# Patient Record
Sex: FEMALE | Race: WHITE | NOT HISPANIC OR LATINO | Employment: FULL TIME | URBAN - METROPOLITAN AREA
[De-identification: names, ages, dates, MRNs, and addresses within clinical notes are randomized per-mention and may not be internally consistent; named-entity substitution may affect disease eponyms.]

---

## 2017-03-07 ENCOUNTER — HOSPITAL ENCOUNTER (OUTPATIENT)
Dept: LAB | Facility: MEDICAL CENTER | Age: 47
End: 2017-03-07
Attending: FAMILY MEDICINE
Payer: COMMERCIAL

## 2017-03-07 LAB
EST. AVERAGE GLUCOSE BLD GHB EST-MCNC: 108 MG/DL
GLUCOSE SERPL-MCNC: 96 MG/DL (ref 65–99)
HBA1C MFR BLD: 5.4 % (ref 0–5.6)

## 2017-03-07 PROCEDURE — 83036 HEMOGLOBIN GLYCOSYLATED A1C: CPT

## 2017-03-07 PROCEDURE — 36415 COLL VENOUS BLD VENIPUNCTURE: CPT

## 2017-03-07 PROCEDURE — 82947 ASSAY GLUCOSE BLOOD QUANT: CPT

## 2017-08-22 ENCOUNTER — HOSPITAL ENCOUNTER (OUTPATIENT)
Dept: RADIOLOGY | Facility: MEDICAL CENTER | Age: 47
End: 2017-08-22
Attending: FAMILY MEDICINE
Payer: COMMERCIAL

## 2017-08-22 DIAGNOSIS — M54.2 NECK PAIN: ICD-10-CM

## 2017-08-22 DIAGNOSIS — M54.9 BACK PAIN, UNSPECIFIED BACK LOCATION, UNSPECIFIED BACK PAIN LATERALITY, UNSPECIFIED CHRONICITY: ICD-10-CM

## 2017-08-22 PROCEDURE — 72040 X-RAY EXAM NECK SPINE 2-3 VW: CPT

## 2017-08-22 PROCEDURE — 72100 X-RAY EXAM L-S SPINE 2/3 VWS: CPT

## 2017-10-17 ENCOUNTER — HOSPITAL ENCOUNTER (OUTPATIENT)
Facility: MEDICAL CENTER | Age: 47
End: 2017-10-17
Payer: COMMERCIAL

## 2017-10-17 LAB
ALBUMIN SERPL BCP-MCNC: 3.4 G/DL (ref 3.2–4.9)
ALBUMIN/GLOB SERPL: 0.9 G/DL
ALP SERPL-CCNC: 58 U/L (ref 30–99)
ALT SERPL-CCNC: 27 U/L (ref 2–50)
ANION GAP SERPL CALC-SCNC: 11 MMOL/L (ref 0–11.9)
AST SERPL-CCNC: 20 U/L (ref 12–45)
BDY FAT % MEASURED: 25.9 %
BILIRUB SERPL-MCNC: 0.6 MG/DL (ref 0.1–1.5)
BP DIAS: 62 MMHG
BP SYS: 98 MMHG
BUN SERPL-MCNC: 11 MG/DL (ref 8–22)
CALCIUM SERPL-MCNC: 9.5 MG/DL (ref 8.5–10.5)
CHLORIDE SERPL-SCNC: 103 MMOL/L (ref 96–112)
CHOLEST SERPL-MCNC: 174 MG/DL (ref 100–199)
CO2 SERPL-SCNC: 25 MMOL/L (ref 20–33)
CREAT SERPL-MCNC: 0.72 MG/DL (ref 0.5–1.4)
DIABETES HTDIA: NO
EVENT NAME HTEVT: NORMAL
GFR SERPL CREATININE-BSD FRML MDRD: >60 ML/MIN/1.73 M 2
GLOBULIN SER CALC-MCNC: 3.6 G/DL (ref 1.9–3.5)
GLUCOSE SERPL-MCNC: 93 MG/DL (ref 65–99)
HDLC SERPL-MCNC: 64 MG/DL
HYPERTENSION HTHYP: NO
LDLC SERPL CALC-MCNC: 88 MG/DL
POTASSIUM SERPL-SCNC: 4.1 MMOL/L (ref 3.6–5.5)
PROT SERPL-MCNC: 7 G/DL (ref 6–8.2)
SCREENING LOC CITY HTCIT: NORMAL
SCREENING LOC STATE HTSTA: NORMAL
SCREENING LOCATION HTLOC: NORMAL
SODIUM SERPL-SCNC: 139 MMOL/L (ref 135–145)
SUBSCRIBER ID HTSID: NORMAL
TRIGL SERPL-MCNC: 110 MG/DL (ref 0–149)

## 2018-03-22 ENCOUNTER — APPOINTMENT (OUTPATIENT)
Dept: RADIOLOGY | Facility: MEDICAL CENTER | Age: 48
End: 2018-03-22
Attending: EMERGENCY MEDICINE
Payer: COMMERCIAL

## 2018-03-22 ENCOUNTER — RESOLUTE PROFESSIONAL BILLING HOSPITAL PROF FEE (OUTPATIENT)
Dept: HOSPITALIST | Facility: MEDICAL CENTER | Age: 48
End: 2018-03-22
Payer: COMMERCIAL

## 2018-03-22 ENCOUNTER — OFFICE VISIT (OUTPATIENT)
Dept: URGENT CARE | Facility: PHYSICIAN GROUP | Age: 48
End: 2018-03-22
Payer: COMMERCIAL

## 2018-03-22 ENCOUNTER — HOSPITAL ENCOUNTER (OUTPATIENT)
Facility: MEDICAL CENTER | Age: 48
End: 2018-03-23
Attending: EMERGENCY MEDICINE | Admitting: HOSPITALIST
Payer: COMMERCIAL

## 2018-03-22 VITALS
OXYGEN SATURATION: 99 % | WEIGHT: 147 LBS | HEART RATE: 168 BPM | DIASTOLIC BLOOD PRESSURE: 68 MMHG | SYSTOLIC BLOOD PRESSURE: 110 MMHG | BODY MASS INDEX: 24.49 KG/M2 | TEMPERATURE: 98.8 F | HEIGHT: 65 IN | RESPIRATION RATE: 16 BRPM

## 2018-03-22 DIAGNOSIS — F41.9 ANXIETY: ICD-10-CM

## 2018-03-22 DIAGNOSIS — R00.0 TACHYCARDIA: ICD-10-CM

## 2018-03-22 DIAGNOSIS — I47.10 SVT (SUPRAVENTRICULAR TACHYCARDIA): ICD-10-CM

## 2018-03-22 DIAGNOSIS — R00.2 HEART PALPITATIONS: Primary | ICD-10-CM

## 2018-03-22 PROBLEM — E83.39 HYPOPHOSPHATASIA: Status: ACTIVE | Noted: 2018-03-22

## 2018-03-22 LAB
ALBUMIN SERPL BCP-MCNC: 4.2 G/DL (ref 3.2–4.9)
ALBUMIN/GLOB SERPL: 1.8 G/DL
ALP SERPL-CCNC: 49 U/L (ref 30–99)
ALT SERPL-CCNC: 10 U/L (ref 2–50)
ANION GAP SERPL CALC-SCNC: 10 MMOL/L (ref 0–11.9)
APTT PPP: 27.7 SEC (ref 24.7–36)
AST SERPL-CCNC: 16 U/L (ref 12–45)
BASOPHILS # BLD AUTO: 0.5 % (ref 0–1.8)
BASOPHILS # BLD: 0.03 K/UL (ref 0–0.12)
BILIRUB SERPL-MCNC: 0.6 MG/DL (ref 0.1–1.5)
BNP SERPL-MCNC: 24 PG/ML (ref 0–100)
BUN SERPL-MCNC: 10 MG/DL (ref 8–22)
CALCIUM SERPL-MCNC: 8.7 MG/DL (ref 8.5–10.5)
CHLORIDE SERPL-SCNC: 107 MMOL/L (ref 96–112)
CO2 SERPL-SCNC: 18 MMOL/L (ref 20–33)
CREAT SERPL-MCNC: 0.8 MG/DL (ref 0.5–1.4)
EKG IMPRESSION: NORMAL
EOSINOPHIL # BLD AUTO: 0.13 K/UL (ref 0–0.51)
EOSINOPHIL NFR BLD: 2 % (ref 0–6.9)
ERYTHROCYTE [DISTWIDTH] IN BLOOD BY AUTOMATED COUNT: 39.8 FL (ref 35.9–50)
EST. AVERAGE GLUCOSE BLD GHB EST-MCNC: 108 MG/DL
GLOBULIN SER CALC-MCNC: 2.3 G/DL (ref 1.9–3.5)
GLUCOSE SERPL-MCNC: 118 MG/DL (ref 65–99)
HBA1C MFR BLD: 5.4 % (ref 0–5.6)
HCT VFR BLD AUTO: 42.7 % (ref 37–47)
HGB BLD-MCNC: 14.8 G/DL (ref 12–16)
IMM GRANULOCYTES # BLD AUTO: 0.02 K/UL (ref 0–0.11)
IMM GRANULOCYTES NFR BLD AUTO: 0.3 % (ref 0–0.9)
INR PPP: 1.05 (ref 0.87–1.13)
LIPASE SERPL-CCNC: 12 U/L (ref 11–82)
LYMPHOCYTES # BLD AUTO: 1.99 K/UL (ref 1–4.8)
LYMPHOCYTES NFR BLD: 30.3 % (ref 22–41)
MAGNESIUM SERPL-MCNC: 1.8 MG/DL (ref 1.5–2.5)
MCH RBC QN AUTO: 31.8 PG (ref 27–33)
MCHC RBC AUTO-ENTMCNC: 34.7 G/DL (ref 33.6–35)
MCV RBC AUTO: 91.6 FL (ref 81.4–97.8)
MONOCYTES # BLD AUTO: 0.63 K/UL (ref 0–0.85)
MONOCYTES NFR BLD AUTO: 9.6 % (ref 0–13.4)
NEUTROPHILS # BLD AUTO: 3.76 K/UL (ref 2–7.15)
NEUTROPHILS NFR BLD: 57.3 % (ref 44–72)
NRBC # BLD AUTO: 0 K/UL
NRBC BLD-RTO: 0 /100 WBC
PHOSPHATE SERPL-MCNC: 1.8 MG/DL (ref 2.5–4.5)
PLATELET # BLD AUTO: 209 K/UL (ref 164–446)
PMV BLD AUTO: 10.6 FL (ref 9–12.9)
POTASSIUM SERPL-SCNC: 3.8 MMOL/L (ref 3.6–5.5)
PROT SERPL-MCNC: 6.5 G/DL (ref 6–8.2)
PROTHROMBIN TIME: 13.4 SEC (ref 12–14.6)
RBC # BLD AUTO: 4.66 M/UL (ref 4.2–5.4)
SODIUM SERPL-SCNC: 135 MMOL/L (ref 135–145)
T4 FREE SERPL-MCNC: 0.92 NG/DL (ref 0.53–1.43)
TROPONIN I SERPL-MCNC: 0.02 NG/ML (ref 0–0.04)
TSH SERPL DL<=0.005 MIU/L-ACNC: 2.51 UIU/ML (ref 0.38–5.33)
WBC # BLD AUTO: 6.6 K/UL (ref 4.8–10.8)

## 2018-03-22 PROCEDURE — 700101 HCHG RX REV CODE 250: Performed by: HOSPITALIST

## 2018-03-22 PROCEDURE — 700102 HCHG RX REV CODE 250 W/ 637 OVERRIDE(OP): Performed by: INTERNAL MEDICINE

## 2018-03-22 PROCEDURE — 96365 THER/PROPH/DIAG IV INF INIT: CPT

## 2018-03-22 PROCEDURE — 84100 ASSAY OF PHOSPHORUS: CPT

## 2018-03-22 PROCEDURE — 99220 PR INITIAL OBSERVATION CARE,LEVL III: CPT | Performed by: HOSPITALIST

## 2018-03-22 PROCEDURE — 93005 ELECTROCARDIOGRAM TRACING: CPT | Performed by: EMERGENCY MEDICINE

## 2018-03-22 PROCEDURE — 84443 ASSAY THYROID STIM HORMONE: CPT

## 2018-03-22 PROCEDURE — 83735 ASSAY OF MAGNESIUM: CPT

## 2018-03-22 PROCEDURE — G0378 HOSPITAL OBSERVATION PER HR: HCPCS

## 2018-03-22 PROCEDURE — 84484 ASSAY OF TROPONIN QUANT: CPT

## 2018-03-22 PROCEDURE — 36415 COLL VENOUS BLD VENIPUNCTURE: CPT

## 2018-03-22 PROCEDURE — 83036 HEMOGLOBIN GLYCOSYLATED A1C: CPT

## 2018-03-22 PROCEDURE — 700105 HCHG RX REV CODE 258: Performed by: HOSPITALIST

## 2018-03-22 PROCEDURE — 96366 THER/PROPH/DIAG IV INF ADDON: CPT

## 2018-03-22 PROCEDURE — 94760 N-INVAS EAR/PLS OXIMETRY 1: CPT

## 2018-03-22 PROCEDURE — A9270 NON-COVERED ITEM OR SERVICE: HCPCS | Performed by: INTERNAL MEDICINE

## 2018-03-22 PROCEDURE — 85025 COMPLETE CBC W/AUTO DIFF WBC: CPT

## 2018-03-22 PROCEDURE — 93005 ELECTROCARDIOGRAM TRACING: CPT

## 2018-03-22 PROCEDURE — 71045 X-RAY EXAM CHEST 1 VIEW: CPT

## 2018-03-22 PROCEDURE — 93306 TTE W/DOPPLER COMPLETE: CPT

## 2018-03-22 PROCEDURE — 85610 PROTHROMBIN TIME: CPT

## 2018-03-22 PROCEDURE — 80053 COMPREHEN METABOLIC PANEL: CPT

## 2018-03-22 PROCEDURE — 93306 TTE W/DOPPLER COMPLETE: CPT | Mod: 26 | Performed by: INTERNAL MEDICINE

## 2018-03-22 PROCEDURE — 99285 EMERGENCY DEPT VISIT HI MDM: CPT

## 2018-03-22 PROCEDURE — 83690 ASSAY OF LIPASE: CPT

## 2018-03-22 PROCEDURE — 83880 ASSAY OF NATRIURETIC PEPTIDE: CPT

## 2018-03-22 PROCEDURE — 85730 THROMBOPLASTIN TIME PARTIAL: CPT

## 2018-03-22 PROCEDURE — 93000 ELECTROCARDIOGRAM COMPLETE: CPT | Performed by: PHYSICIAN ASSISTANT

## 2018-03-22 PROCEDURE — 84439 ASSAY OF FREE THYROXINE: CPT

## 2018-03-22 PROCEDURE — 99205 OFFICE O/P NEW HI 60 MIN: CPT | Performed by: PHYSICIAN ASSISTANT

## 2018-03-22 RX ORDER — BISACODYL 10 MG
10 SUPPOSITORY, RECTAL RECTAL
Status: DISCONTINUED | OUTPATIENT
Start: 2018-03-22 | End: 2018-03-23 | Stop reason: HOSPADM

## 2018-03-22 RX ORDER — ONDANSETRON 2 MG/ML
4 INJECTION INTRAMUSCULAR; INTRAVENOUS EVERY 4 HOURS PRN
Status: DISCONTINUED | OUTPATIENT
Start: 2018-03-22 | End: 2018-03-23 | Stop reason: HOSPADM

## 2018-03-22 RX ORDER — PROMETHAZINE HYDROCHLORIDE 25 MG/1
12.5-25 SUPPOSITORY RECTAL EVERY 4 HOURS PRN
Status: DISCONTINUED | OUTPATIENT
Start: 2018-03-22 | End: 2018-03-23 | Stop reason: HOSPADM

## 2018-03-22 RX ORDER — IBUPROFEN 200 MG
400-800 TABLET ORAL EVERY 6 HOURS PRN
Status: SHIPPED | COMMUNITY
End: 2022-05-20

## 2018-03-22 RX ORDER — SODIUM CHLORIDE 9 MG/ML
INJECTION, SOLUTION INTRAVENOUS CONTINUOUS
Status: DISCONTINUED | OUTPATIENT
Start: 2018-03-22 | End: 2018-03-23 | Stop reason: HOSPADM

## 2018-03-22 RX ORDER — PROMETHAZINE HYDROCHLORIDE 25 MG/1
12.5-25 TABLET ORAL EVERY 4 HOURS PRN
Status: DISCONTINUED | OUTPATIENT
Start: 2018-03-22 | End: 2018-03-23 | Stop reason: HOSPADM

## 2018-03-22 RX ORDER — ONDANSETRON 4 MG/1
4 TABLET, ORALLY DISINTEGRATING ORAL EVERY 4 HOURS PRN
Status: DISCONTINUED | OUTPATIENT
Start: 2018-03-22 | End: 2018-03-23 | Stop reason: HOSPADM

## 2018-03-22 RX ORDER — ACETAMINOPHEN 325 MG/1
650 TABLET ORAL EVERY 6 HOURS PRN
Status: DISCONTINUED | OUTPATIENT
Start: 2018-03-22 | End: 2018-03-23 | Stop reason: HOSPADM

## 2018-03-22 RX ORDER — AMOXICILLIN 250 MG
2 CAPSULE ORAL 2 TIMES DAILY
Status: DISCONTINUED | OUTPATIENT
Start: 2018-03-22 | End: 2018-03-23 | Stop reason: HOSPADM

## 2018-03-22 RX ORDER — POLYETHYLENE GLYCOL 3350 17 G/17G
1 POWDER, FOR SOLUTION ORAL
Status: DISCONTINUED | OUTPATIENT
Start: 2018-03-22 | End: 2018-03-23 | Stop reason: HOSPADM

## 2018-03-22 RX ORDER — ALPRAZOLAM 0.5 MG/1
0.5 TABLET ORAL
Status: COMPLETED | OUTPATIENT
Start: 2018-03-22 | End: 2018-03-22

## 2018-03-22 RX ADMIN — POTASSIUM PHOSPHATE, MONOBASIC AND POTASSIUM PHOSPHATE, DIBASIC 30 MMOL: 224; 236 INJECTION, SOLUTION INTRAVENOUS at 18:02

## 2018-03-22 RX ADMIN — SODIUM CHLORIDE: 9 INJECTION, SOLUTION INTRAVENOUS at 16:10

## 2018-03-22 RX ADMIN — ALPRAZOLAM 0.25 MG: 0.5 TABLET ORAL at 23:10

## 2018-03-22 ASSESSMENT — ENCOUNTER SYMPTOMS
TINGLING: 0
COUGH: 0
MYALGIAS: 0
SPEECH CHANGE: 0
LOSS OF CONSCIOUSNESS: 0
FEVER: 0
DIZZINESS: 0
FEVER: 0
PND: 0
WHEEZING: 0
DIARRHEA: 0
SENSORY CHANGE: 0
SHORTNESS OF BREATH: 0
DOUBLE VISION: 0
CONSTIPATION: 0
HEARTBURN: 0
SENSORY CHANGE: 0
FOCAL WEAKNESS: 0
PALPITATIONS: 1
PALPITATIONS: 1
CHEST PRESSURE: 0
DEPRESSION: 0
SPUTUM PRODUCTION: 0
ABDOMINAL PAIN: 0
HEADACHES: 0
BLURRED VISION: 0
CHILLS: 0
SHORTNESS OF BREATH: 0
VOMITING: 0
PND: 0
NAUSEA: 0
ORTHOPNEA: 0
VOMITING: 0
COUGH: 0
HEADACHES: 0
WEIGHT LOSS: 0
BRUISES/BLEEDS EASILY: 0
NECK PAIN: 0
FOCAL WEAKNESS: 0
NEAR-SYNCOPE: 0
DIZZINESS: 0
DIAPHORESIS: 1
HEMOPTYSIS: 0
SPUTUM PRODUCTION: 0
NAUSEA: 0
SYNCOPE: 0
CLAUDICATION: 0
WEAKNESS: 0
DIAPHORESIS: 1

## 2018-03-22 ASSESSMENT — COPD QUESTIONNAIRES
HAVE YOU SMOKED AT LEAST 100 CIGARETTES IN YOUR ENTIRE LIFE: NO/DON'T KNOW
DURING THE PAST 4 WEEKS HOW MUCH DID YOU FEEL SHORT OF BREATH: NONE/LITTLE OF THE TIME
DO YOU EVER COUGH UP ANY MUCUS OR PHLEGM?: NO/ONLY WITH OCCASIONAL COLDS OR INFECTIONS
COPD SCREENING SCORE: 1

## 2018-03-22 ASSESSMENT — PATIENT HEALTH QUESTIONNAIRE - PHQ9
2. FEELING DOWN, DEPRESSED, IRRITABLE, OR HOPELESS: NOT AT ALL
SUM OF ALL RESPONSES TO PHQ9 QUESTIONS 1 AND 2: 0
1. LITTLE INTEREST OR PLEASURE IN DOING THINGS: NOT AT ALL

## 2018-03-22 ASSESSMENT — LIFESTYLE VARIABLES
EVER_SMOKED: NEVER
DO YOU DRINK ALCOHOL: NO
EVER_SMOKED: NEVER

## 2018-03-22 ASSESSMENT — PAIN SCALES - GENERAL
PAINLEVEL_OUTOF10: 0

## 2018-03-22 NOTE — ED NOTES
Med Rec completed per patient  Allergies reviewed  No ORAL antibiotics in last 30 days    Patient took an immune boost this AM 3-22-18

## 2018-03-22 NOTE — CONSULTS
DATE OF CONSULTATION: 3/22/18    REASON FOR CONSULTATION: SVT    HISTORY OF PRESENT ILLNESS:   48 yr old with no significant PMx presented to the ED after she was found to have SVT at urgent care today. Patient reports waking up suddenly with sensation of palpitations on continuous duration, not associated with any exertion, chest pain, shortness of breath, dizziness, loss of consciousness, or sense of doom. Patient denies prior history of similar symptoms and denies recent alcohol, drug, or tobacco use.   She is a active individual who is a runner and denies any palpitations with exertion. Grandfather had CAD other denies any arrhythmia or family history of SCD.     PAST MEDICAL HISTORY:  History reviewed. No pertinent past medical history.      SURGICAL HISTORY:  Past Surgical History:   Procedure Laterality Date   • APPENDECTOMY     • GYN SURGERY         • OTHER ORTHOPEDIC SURGERY      right hip         SOCIAL HISTORY:  Social History     Social History   • Marital status:      Spouse name: N/A   • Number of children: N/A   • Years of education: N/A     Social History Main Topics   • Smoking status: Never Smoker   • Smokeless tobacco: Never Used   • Alcohol use Yes      Comment: occ   • Drug use: No   • Sexual activity: Not on file     Other Topics Concern   • Not on file     Social History Narrative   • No narrative on file         FAMILY HISTORY:  History reviewed. No pertinent family history.  Grandfather: CAD,  of MI @age of 65.    HOME MEDICATIONS:   Prior to Admission medications    Medication Sig Start Date End Date Taking? Authorizing Provider   Calcium Carb-Cholecalciferol (CALCIUM 500 + D3 PO) Take 1 Tab by mouth every day.   Yes Physician Outpatient   CINNAMON PO Take 1 Tab by mouth every day.   Yes Physician Outpatient   ibuprofen (MOTRIN) 200 MG Tab Take 400-800 mg by mouth every 6 hours as needed for Mild Pain.   Yes Physician Outpatient         REVIEW OF SYSTEMS:  Review of  "Systems   Constitutional: Positive for diaphoresis. Negative for chills, fever and weight loss.   HENT: Negative for hearing loss.    Eyes: Negative for blurred vision.   Respiratory: Negative for cough, sputum production, shortness of breath and wheezing.    Cardiovascular: Positive for palpitations. Negative for chest pain, orthopnea, claudication, leg swelling and PND.   Gastrointestinal: Negative for abdominal pain, constipation, diarrhea, heartburn, nausea and vomiting.   Genitourinary: Negative for dysuria.   Musculoskeletal: Negative for myalgias.   Skin: Negative for rash.   Neurological: Negative for dizziness, sensory change, focal weakness, weakness and headaches.   Psychiatric/Behavioral: Negative for depression.       PHYSICAL EXAMINATION:  Vitals:    03/22/18 0939 03/22/18 1000 03/22/18 1030 03/22/18 1100   BP: 134/83      Pulse: (!) 118 (!) 109 (!) 116 (!) 104   Resp: 20 20 15 15   Temp: 37.6 °C (99.7 °F)      SpO2: 99% 98% 98% 99%   Weight: 63.5 kg (140 lb)      Height: 1.651 m (5' 5\")        Body mass index is 23.3 kg/m².  /83   Pulse (!) 104   Temp 37.6 °C (99.7 °F)   Resp 15   Ht 1.651 m (5' 5\")   Wt 63.5 kg (140 lb)   LMP 02/27/2018   SpO2 99%   BMI 23.30 kg/m²   O2 therapy: Pulse Oximetry: 99 %, O2 Delivery: None (Room Air)    Physical Exam   Constitutional: She is oriented to person, place, and time and well-developed, well-nourished, and in no distress. No distress.   HENT:   Head: Normocephalic and atraumatic.   Eyes: EOM are normal. Pupils are equal, round, and reactive to light.   Neck: Normal range of motion. No JVD present. No thyromegaly present.   Cardiovascular: Regular rhythm and normal heart sounds.  Exam reveals no gallop and no friction rub.    No murmur heard.  Tachycardia   Pulmonary/Chest: Effort normal. No respiratory distress. She has no wheezes. She has no rales. She exhibits no tenderness.   Abdominal: Soft. Bowel sounds are normal. She exhibits no distension " and no mass. There is no tenderness. There is no rebound and no guarding.   Musculoskeletal: Normal range of motion. She exhibits no edema or tenderness.   Neurological: She is alert and oriented to person, place, and time. GCS score is 15.   Skin: Skin is warm and dry. She is not diaphoretic.   Psychiatric:   Anxious and tearful        LABORATORY DATA:     Recent Results (from the past 24 hour(s))   EKG (ER)    Collection Time: 18  9:41 AM   Result Value Ref Range    Report       [facility]    Test Date:  2018  Pt Name:    RADHA GUAMAN              Department: ER  MRN:        [id number]                      Room:       [facility]  Gender:     Female                       Technician: [id number]  :        1970                   Requested By:ER TRIAGE PROTOCOL  Order #:    [id number]                    Reading MD:    Measurements  Intervals                                Axis  Rate:       114                          P:          79  NE:         148                          QRS:        140  QRSD:       84                           T:          35  QT:         340  QTc:        469    Interpretive Statements  SINUS TACHYCARDIA  RIGHT AXIS DEVIATION  RSR' IN V1 OR V2, PROBABLY NORMAL VARIANT  No previous ECG available for comparison     Troponin    Collection Time: 18  9:45 AM   Result Value Ref Range    Troponin I 0.02 0.00 - 0.04 ng/mL   Btype Natriuretic Peptide    Collection Time: 18  9:45 AM   Result Value Ref Range    B Natriuretic Peptide 24 0 - 100 pg/mL   CBC with Differential    Collection Time: 18  9:45 AM   Result Value Ref Range    WBC 6.6 4.8 - 10.8 K/uL    RBC 4.66 4.20 - 5.40 M/uL    Hemoglobin 14.8 12.0 - 16.0 g/dL    Hematocrit 42.7 37.0 - 47.0 %    MCV 91.6 81.4 - 97.8 fL    MCH 31.8 27.0 - 33.0 pg    MCHC 34.7 33.6 - 35.0 g/dL    RDW 39.8 35.9 - 50.0 fL    Platelet Count 209 164 - 446 K/uL    MPV 10.6 9.0 - 12.9 fL    Neutrophils-Polys  57.30 44.00 - 72.00 %    Lymphocytes 30.30 22.00 - 41.00 %    Monocytes 9.60 0.00 - 13.40 %    Eosinophils 2.00 0.00 - 6.90 %    Basophils 0.50 0.00 - 1.80 %    Immature Granulocytes 0.30 0.00 - 0.90 %    Nucleated RBC 0.00 /100 WBC    Neutrophils (Absolute) 3.76 2.00 - 7.15 K/uL    Lymphs (Absolute) 1.99 1.00 - 4.80 K/uL    Monos (Absolute) 0.63 0.00 - 0.85 K/uL    Eos (Absolute) 0.13 0.00 - 0.51 K/uL    Baso (Absolute) 0.03 0.00 - 0.12 K/uL    Immature Granulocytes (abs) 0.02 0.00 - 0.11 K/uL    NRBC (Absolute) 0.00 K/uL   Complete Metabolic Panel (CMP)    Collection Time: 03/22/18  9:45 AM   Result Value Ref Range    Sodium 135 135 - 145 mmol/L    Potassium 3.8 3.6 - 5.5 mmol/L    Chloride 107 96 - 112 mmol/L    Co2 18 (L) 20 - 33 mmol/L    Anion Gap 10.0 0.0 - 11.9    Glucose 118 (H) 65 - 99 mg/dL    Bun 10 8 - 22 mg/dL    Creatinine 0.80 0.50 - 1.40 mg/dL    Calcium 8.7 8.5 - 10.5 mg/dL    AST(SGOT) 16 12 - 45 U/L    ALT(SGPT) 10 2 - 50 U/L    Alkaline Phosphatase 49 30 - 99 U/L    Total Bilirubin 0.6 0.1 - 1.5 mg/dL    Albumin 4.2 3.2 - 4.9 g/dL    Total Protein 6.5 6.0 - 8.2 g/dL    Globulin 2.3 1.9 - 3.5 g/dL    A-G Ratio 1.8 g/dL   Prothrombin Time    Collection Time: 03/22/18  9:45 AM   Result Value Ref Range    PT 13.4 12.0 - 14.6 sec    INR 1.05 0.87 - 1.13   APTT    Collection Time: 03/22/18  9:45 AM   Result Value Ref Range    APTT 27.7 24.7 - 36.0 sec   Lipase    Collection Time: 03/22/18  9:45 AM   Result Value Ref Range    Lipase 12 11 - 82 U/L   TSH    Collection Time: 03/22/18  9:45 AM   Result Value Ref Range    TSH 2.510 0.380 - 5.330 uIU/mL   FREE THYROXINE    Collection Time: 03/22/18  9:45 AM   Result Value Ref Range    Free T-4 0.92 0.53 - 1.43 ng/dL   ESTIMATED GFR    Collection Time: 03/22/18  9:45 AM   Result Value Ref Range    GFR If African American >60 >60 mL/min/1.73 m 2    GFR If Non African American >60 >60 mL/min/1.73 m 2       DX-CHEST-LIMITED (1 VIEW)   Final Result      No acute  cardiopulmonary process is identified.          ASSESSMENT AND PLAN:     //SVT  //Sinus tachycardia   //Palpitations  //Anxiety    - Converted using Adenosine in REMSA  - Do not recommend chronic suppressive therapy. This was an isolated episode.   - Transthoracic echocardiogram to rule out structural heart disease.   - Tachycardic in the ED likely related to anxiety. Monitor telemetry and overnight  observation recommended.   - TSH wnl.       Quality Measures  Quality-Core Measures

## 2018-03-22 NOTE — PROGRESS NOTES
Subjective:      Yudith Coburn is a 48 y.o. female who presents with Heart Problem (feels like her heart is beating hard and fast since this morning)    PMH: Reviewed with patient/family member/EPIC.    MEDS: Cinnamon, OTC calcium   ALLERGIES: No Known Allergies  SURGHX: No past surgical history on file.  SOCHX:  PT drinks occasionally, does not smoke, no drugs.  FH:Reviewed with patient/family member/EPIC.           Patient presents to clinic this morning with a complaint of heart palpitations that began upon waking this morning. Patient states this did not wake her up, however she noted it since she got out of bed. Patient denies history of this in the past.    Patient denies history of A. fib, palpitations, MI, cardiac disease, thyroid disease. Patient denies history of diabetes.  Denies family history of same.    Patient does not smoke, drinks socially, no illicit drug use.    Patient takes over-the-counter cinnamon, and calcium, no other medications or over-the-counter medications.    Patient did take 2 regular dose aspirin this morning when she noticed her tachycardia.              Palpitations    This is a new problem. The current episode started today. The problem occurs constantly. The problem has been unchanged. On average, each episode lasts 2 hours. Nothing aggravates the symptoms. Associated symptoms include diaphoresis. Pertinent negatives include no chest fullness, chest pain, coughing, dizziness, fever, malaise/fatigue, nausea, near-syncope, shortness of breath, syncope or vomiting. She has tried nothing for the symptoms. The treatment provided no relief. There are no known risk factors. There is no history of anemia, anxiety, drug use, heart disease, hyperthyroidism or a valve disorder.       Review of Systems   Constitutional: Positive for diaphoresis. Negative for fever and malaise/fatigue.   HENT: Negative for congestion.    Respiratory: Negative for cough, sputum production and shortness of  "breath.    Cardiovascular: Positive for palpitations. Negative for chest pain, leg swelling, syncope, PND and near-syncope.   Gastrointestinal: Negative for nausea and vomiting.   Neurological: Negative for dizziness, tingling, sensory change, speech change, focal weakness, loss of consciousness and headaches.   All other systems reviewed and are negative.         Objective:     /68   Pulse (!) 168   Temp 37.1 °C (98.8 °F)   Resp 16   Ht 1.651 m (5' 5\")   Wt 66.7 kg (147 lb)   SpO2 99%   BMI 24.46 kg/m²      Physical Exam   Constitutional: She is oriented to person, place, and time. She appears well-developed and well-nourished. She is cooperative. No distress.   HENT:   Head: Normocephalic and atraumatic.   Right Ear: External ear normal.   Left Ear: External ear normal.   Nose: Nose normal.   Mouth/Throat: Uvula is midline, oropharynx is clear and moist and mucous membranes are normal.   Eyes: Conjunctivae and EOM are normal. Pupils are equal, round, and reactive to light.   Neck: Trachea normal, normal range of motion and phonation normal. Neck supple. No JVD present. Carotid bruit is not present. No thyroid mass and no thyromegaly present.   Cardiovascular: Regular rhythm, normal heart sounds and intact distal pulses.  Tachycardia present.    Pulmonary/Chest: Effort normal and breath sounds normal.   Abdominal: Soft.   Musculoskeletal: Normal range of motion.   Neurological: She is alert and oriented to person, place, and time. Gait normal.   Skin: Skin is warm and dry. Capillary refill takes less than 2 seconds.   Psychiatric: She has a normal mood and affect.   Nursing note and vitals reviewed.         EKG Interpretation   Interpreted by me   Rhythm:   sinus   Rate: 153  Axis: RAD  Ectopy: none   Conduction: normal   ST Segments: depression inferior II, III  T Waves: no acute change   Q Waves: none   Clinical Impression: no acute changes and normal EKG     EKG scanned into chart  Assessment/Plan: "     1. Heart palpitations  EKG - Clinic Performed   2. Tachycardia     Pt took ASA at home (325mg x 2)    PT requires evaluation and treatment at a facility that can provide a higher level of care due to acuity of illness/complaint.     I spoke with CHANTAL Davis who is aware of pt CC, HPI, VS and transport mode(EMS).

## 2018-03-22 NOTE — H&P
Hospital Medicine History and Physical    Date of Service  3/22/2018    Chief Complaint  Chief Complaint   Patient presents with   • Rapid Heart Beat   • Palpitations       History of Presenting Illness  48 y.o. female who presented 3/22/2018 with no significant pmh is coming today due to palpitation, patient went to urgent care c/o palpitation EMS were called and she was found to have SVT 's she received adenosine, patient continue feeling palpitation but is better her HR is in the low 100's, patient denies any dizziness, sob, chest pain no focal weakness, patient was evaluated by cardio and recommended observation for now, getting echo, tsh is wnl, patient drinks at least 4 cups of coffee daily, no on OTC medication for weight lose or energy drinks, does not smoke of uses drugs, alcohol occasionally, denies fever or chills, no diaphoresis, not in distress, she is alert and oriented and able to give good history,   Patient will be admitted for further workup and evaluation.   She denies abdominal pain, no N/V/D/C.   Primary Care Physician  Ling Valladares M.D.    Consultants  cardio    Code Status  Full code.     Review of Systems  Review of Systems   Constitutional: Negative for chills and fever.   HENT: Negative for hearing loss and tinnitus.    Eyes: Negative for blurred vision and double vision.   Respiratory: Negative for cough and hemoptysis.    Cardiovascular: Positive for palpitations (still mild palpitation. ). Negative for chest pain.   Gastrointestinal: Negative for heartburn and melena.   Genitourinary: Negative for dysuria.   Musculoskeletal: Negative for myalgias and neck pain.   Skin: Negative for rash.   Neurological: Negative for dizziness and headaches.   Endo/Heme/Allergies: Does not bruise/bleed easily.   Psychiatric/Behavioral: Negative for depression.          Past Medical History  No pmh.    Surgical History  Past Surgical History:   Procedure Laterality Date   • APPENDECTOMY     •  "GYN SURGERY         • OTHER ORTHOPEDIC SURGERY      right hip       Medications  Not on prescribed medications.     Family History  + CAD, grand father  from [place].     Social History  Social History   Substance Use Topics   • Smoking status: Never Smoker   • Smokeless tobacco: Never Used   • Alcohol use Yes      Comment: occ       Allergies  Allergies   Allergen Reactions   • Hydrocodone Itching     \"itching\"         Physical Exam  Laboratory   Hemodynamics  Temp (24hrs), Av.6 °C (99.7 °F), Min:37.6 °C (99.7 °F), Max:37.6 °C (99.7 °F)   Temperature: 37.6 °C (99.7 °F)  Pulse  Av.5  Min: 98  Max: 118 Heart Rate (Monitored): 93  Blood Pressure: 134/83, NIBP: 110/75      Respiratory      Respiration: 18, Pulse Oximetry: 98 %             Physical Exam   Constitutional: She is oriented to person, place, and time. She appears well-developed. No distress.   HENT:   Head: Normocephalic.   Mouth/Throat: No oropharyngeal exudate.   Eyes: Conjunctivae are normal. No scleral icterus.   Neck: Normal range of motion. Neck supple. No JVD present.   Cardiovascular: Regular rhythm and normal heart sounds.    No murmur heard.  Pulmonary/Chest: Effort normal and breath sounds normal. No stridor. No respiratory distress. She has no wheezes.   Abdominal: Soft. Bowel sounds are normal. She exhibits no distension. There is no tenderness.   Musculoskeletal: Normal range of motion. She exhibits no tenderness.   Lymphadenopathy:     She has no cervical adenopathy.   Neurological: She is alert and oriented to person, place, and time. She exhibits normal muscle tone.   Skin: No erythema.   Psychiatric: She has a normal mood and affect.   Nursing note and vitals reviewed.      Recent Labs      18   0945   WBC  6.6   RBC  4.66   HEMOGLOBIN  14.8   HEMATOCRIT  42.7   MCV  91.6   MCH  31.8   MCHC  34.7   RDW  39.8   PLATELETCT  209   MPV  10.6     Recent Labs      18   0945   SODIUM  135   POTASSIUM  3.8   CHLORIDE  " 107   CO2  18*   GLUCOSE  118*   BUN  10   CREATININE  0.80   CALCIUM  8.7     Recent Labs      03/22/18   0945   ALTSGPT  10   ASTSGOT  16   ALKPHOSPHAT  49   TBILIRUBIN  0.6   LIPASE  12   GLUCOSE  118*     Recent Labs      03/22/18   0945   APTT  27.7   INR  1.05     Recent Labs      03/22/18   0945   BNPBTYPENAT  24         Lab Results   Component Value Date    TROPONINI 0.02 03/22/2018       Imaging  cxr reviewed  ekg reviewed.    Assessment/Plan     I anticipate this patient is appropriate for observation status at this time.    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 Stable now back to NSR after adenosine  Cardiologist consulted.  Checking her electrolytes, getting echo. Will repeat ekg in am.             VTE prophylaxis: scd's

## 2018-03-22 NOTE — ED NOTES
Pt ambulated to restroom with steady gait.  Pt reports she is feeling anxious and notes some tingling all over.  No c/o pain.  Neuro intact.    Pt aware of diet orders but declines any food at this time.    Call light in reach.  Awaiting bed assignment.

## 2018-03-22 NOTE — ED TRIAGE NOTES
Pt BIB EMS from .  Pt was driving her car and felt her heart beating rapidly and took herself to the .  Pt reports she did wake up this morning feeling rapid heart beat also.  No chest pain, SOB.  Pt reports feeling sweaty this morning.  Pt was found to be in SVT on EKG and EMS was called.  En route pt was given 6 of Adenosine without change and than 12 mg of Adenosine with conversion to ST.  Pt arrives to ER in , EKG completed.  Pt denies any complaints.  Pt has bilateral IV's blood drawn and sent to lab.  ERP to see.

## 2018-03-22 NOTE — ED PROVIDER NOTES
ED Provider Note    Scribed for Lizzie Rosales M.D. by Maryann Holbrook. 3/22/2018, 9:57 AM.    Primary care provider: Ling Valladares M.D.  Means of arrival: Ambulance  History obtained from: Patient and Spouse  History limited by: None    CHIEF COMPLAINT  Chief Complaint   Patient presents with   • Rapid Heart Beat   • Palpitations       HPI  Yudith Coburn is a 48 y.o. female who presents to the Emergency Department by ambulance for rapid heart beat and palpitations with an onset of today. Patient woke up this morning with a rapid heart beat and palpitations. She drove to the urgent care as her symptoms remained persistent. She was found to be in supraventricular tachycardia at a rate of 153 on EKG. She converted to ST with 12 mg of Adenosine. She presents to the ED in sinus rhythm with a heart rate of 118. Negative for chest pain, shortness of breath, leg swelling or pain. She denies a cardiac history or history of DVT, PE or arrhythmias. No recent long travel or car rides.      REVIEW OF SYSTEMS  Pertinent positives include rapid heart beat, palpitations and supraventricular tachycardia. Pertinent negatives include no chest pain, shortness of breath, leg swelling or leg pain. All other systems reviewed and negative. See HPI for further details. C.      PAST MEDICAL HISTORY  Patient denies a cardiac or thyroid history. No prior DVT or PE.       SURGICAL HISTORY  Patient has a past surgical history that includes appendectomy; gyn surgery; and other orthopedic surgery.      SOCIAL HISTORY  Social History   Substance Use Topics   • Smoking status: Never Smoker   • Smokeless tobacco: Never Used   • Alcohol use Yes      Comment: occ      History   Drug Use No   Patient works as a counselor at an elementary school.      FAMILY HISTORY  History reviewed. No pertinent family history.      CURRENT MEDICATIONS  No current facility-administered medications for this encounter.     Current Outpatient Prescriptions:   •   "Calcium Carb-Cholecalciferol (CALCIUM 500 + D3 PO), Take 1 Tab by mouth every day., Disp: , Rfl:   •  CINNAMON PO, Take 1 Tab by mouth every day., Disp: , Rfl:   •  ibuprofen (MOTRIN) 200 MG Tab, Take 400-800 mg by mouth every 6 hours as needed for Mild Pain., Disp: , Rfl:     ALLERGIES  None      PHYSICAL EXAM  VITAL SIGNS: /83   Pulse (!) 118   Temp 37.6 °C (99.7 °F)   Resp 20   Ht 1.651 m (5' 5\")   Wt 63.5 kg (140 lb)   LMP 02/27/2018   SpO2 99%   BMI 23.30 kg/m²     Constitutional:  Awake, Speaking in full sentences , able to answer questions  HENT: Nose is normal in appearance without rhinorrhea, external ears are normal,  moist mucous membranes  Eyes: Anicteric,  pupils are equal round and reactive, there is no conjunctival drainage or pallor   Neck: The trachea is midline, there is no obvious mass or meningeal signs  Cardiovascular: Equal radial pulsation, regular rate and rhythm without murmurs gallops or rubs  Thorax & Lungs: Respiratory rate and effort are normal. There is normal chest excursion with respiration.  No wheezes rhonchi or rales noted.  Abdomen: Abdomen is normal in appearance, normal bowel sounds, no pain with cough, nonpulsatile  :  No CVA tenderness to palpation  Musculoskeletal: No deformities noted in all 4 extremities. Actively moves all 4 extremities  Skin: Visualized skin is warm, no erythema, no rash.  Neurologic:  Cranial nerves II through XII are intact there is no focal abnormality noted.  Psychiatric: Normal mood and mentation      DIAGNOSTIC STUDIES / PROCEDURES    LABS  Results for orders placed or performed during the hospital encounter of 03/22/18   Troponin   Result Value Ref Range    Troponin I 0.02 0.00 - 0.04 ng/mL   Btype Natriuretic Peptide   Result Value Ref Range    B Natriuretic Peptide 24 0 - 100 pg/mL   CBC with Differential   Result Value Ref Range    WBC 6.6 4.8 - 10.8 K/uL    RBC 4.66 4.20 - 5.40 M/uL    Hemoglobin 14.8 12.0 - 16.0 g/dL    " Hematocrit 42.7 37.0 - 47.0 %    MCV 91.6 81.4 - 97.8 fL    MCH 31.8 27.0 - 33.0 pg    MCHC 34.7 33.6 - 35.0 g/dL    RDW 39.8 35.9 - 50.0 fL    Platelet Count 209 164 - 446 K/uL    MPV 10.6 9.0 - 12.9 fL    Neutrophils-Polys 57.30 44.00 - 72.00 %    Lymphocytes 30.30 22.00 - 41.00 %    Monocytes 9.60 0.00 - 13.40 %    Eosinophils 2.00 0.00 - 6.90 %    Basophils 0.50 0.00 - 1.80 %    Immature Granulocytes 0.30 0.00 - 0.90 %    Nucleated RBC 0.00 /100 WBC    Neutrophils (Absolute) 3.76 2.00 - 7.15 K/uL    Lymphs (Absolute) 1.99 1.00 - 4.80 K/uL    Monos (Absolute) 0.63 0.00 - 0.85 K/uL    Eos (Absolute) 0.13 0.00 - 0.51 K/uL    Baso (Absolute) 0.03 0.00 - 0.12 K/uL    Immature Granulocytes (abs) 0.02 0.00 - 0.11 K/uL    NRBC (Absolute) 0.00 K/uL   Complete Metabolic Panel (CMP)   Result Value Ref Range    Sodium 135 135 - 145 mmol/L    Potassium 3.8 3.6 - 5.5 mmol/L    Chloride 107 96 - 112 mmol/L    Co2 18 (L) 20 - 33 mmol/L    Anion Gap 10.0 0.0 - 11.9    Glucose 118 (H) 65 - 99 mg/dL    Bun 10 8 - 22 mg/dL    Creatinine 0.80 0.50 - 1.40 mg/dL    Calcium 8.7 8.5 - 10.5 mg/dL    AST(SGOT) 16 12 - 45 U/L    ALT(SGPT) 10 2 - 50 U/L    Alkaline Phosphatase 49 30 - 99 U/L    Total Bilirubin 0.6 0.1 - 1.5 mg/dL    Albumin 4.2 3.2 - 4.9 g/dL    Total Protein 6.5 6.0 - 8.2 g/dL    Globulin 2.3 1.9 - 3.5 g/dL    A-G Ratio 1.8 g/dL   Prothrombin Time   Result Value Ref Range    PT 13.4 12.0 - 14.6 sec    INR 1.05 0.87 - 1.13   APTT   Result Value Ref Range    APTT 27.7 24.7 - 36.0 sec   Lipase   Result Value Ref Range    Lipase 12 11 - 82 U/L   TSH   Result Value Ref Range    TSH 2.510 0.380 - 5.330 uIU/mL   FREE THYROXINE   Result Value Ref Range    Free T-4 0.92 0.53 - 1.43 ng/dL   ESTIMATED GFR   Result Value Ref Range    GFR If African American >60 >60 mL/min/1.73 m 2    GFR If Non African American >60 >60 mL/min/1.73 m 2   EKG (ER)   Result Value Ref Range    Report       [facility] Emergency  Dept.    Test Date:  2018  Pt Name:    RADHA GUAMAN              Department: ER  MRN:        [id number]                      Room:       RD 08  Gender:     Female                       Technician: [id number]  :        1970                   Requested By:ER TRIAGE PROTOCOL  Order #:    [id number]                    Reading MD:    Measurements  Intervals                                Axis  Rate:       114                          P:          79  NE:         148                          QRS:        140  QRSD:       84                           T:          35  QT:         340  QTc:        469    Interpretive Statements  SINUS TACHYCARDIA  RIGHT AXIS DEVIATION  RSR' IN V1 OR V2, PROBABLY NORMAL VARIANT  No previous ECG available for comparison        All labs reviewed by me.      EKG  I interpreted this EKG myself.  This is a 12-lead study. The rhythm is sinus tachycardia with a rate of 114.  Right axis deviation with RSR' in V2. No ectopy. Resolution of SVT.     Review of EKG from urgent care shows SVT at rate of 153.    EMS monitoring strip after Adenosine showed PVC and sinus at rate of 118.      RADIOLOGY  DX-CHEST-LIMITED (1 VIEW)   Final Result      No acute cardiopulmonary process is identified.        The radiologist's interpretation of all radiological studies have been reviewed by me.      COURSE & MEDICAL DECISION MAKING  Nursing notes and vital signs were reviewed. (See chart for details)  The patient's records were obtained and reviewed which shows an urgent care visit for palpitations this morning. History was obtained from the patient and her spouse.     The patient presents with rapid heart beat and palpitations, and the differential diagnosis includes but is not limited to thyroid abnormality, anemia, arrythmia, electrolyte abnormality.       9:58 AM Initial orders in the Emergency Department included XR chest, EKG and laboratory testing: TSH, free thyroxine, troponin, BNP, CBC with  differential, CMP, estimated GFR, prothrombin time, APTT and lipase.     ED testing reveals normal thyroid function, negative troponin, normal BNP.    10:50 AM On repeat evaluation of the patient, heart rate is 107. Lab results were reviewed with her. Plan to consult with cardiology.    10:53 AM Paged Cardiology.    11:08 AM Spoke with Dr. Cam, Cardiology, regarding the patient's new SVT which resolved and persistent tachycardia. EKG shows right axis deviation. He will consult.    11:18 AM Patient was evaluated by Cardiology who advised the patient be admitted overnight for further testing.     11:30 AM Spoke with Dr. Rebollar, [facility], regarding the continued tachycardia after SVT resolved with Adenosine. He will consult and admit the patient further evaluation and care.      DISPOSITION  Patient will be admitted to Dr. Rebollar, [facility], in stable condition.       FINAL IMPRESSION  1. SVT (supraventricular tachycardia) (CMS-HCC)         Maryann SANTOS (Scribe), am scribing for, and in the presence of, Lizzie Rosales M.D.    Electronically signed by: Maryann Holbrook (Scribe), 3/22/2018    Lizzie SANTOS M.D. personally performed the services described in this documentation, as scribed by Maryann Holbrook in my presence, and it is both accurate and complete.    The note accurately reflects work and decisions made by me.  Lizzie Rosales  3/22/2018  12:22 PM

## 2018-03-22 NOTE — ED NOTES
Pt resting in [facility].  No complaints or needs voiced at this time.  Call light in reach.  Will continue to monitor.

## 2018-03-23 VITALS
OXYGEN SATURATION: 97 % | TEMPERATURE: 99.6 F | SYSTOLIC BLOOD PRESSURE: 121 MMHG | RESPIRATION RATE: 18 BRPM | WEIGHT: 142.42 LBS | HEART RATE: 77 BPM | DIASTOLIC BLOOD PRESSURE: 66 MMHG | HEIGHT: 65 IN | BODY MASS INDEX: 23.73 KG/M2

## 2018-03-23 PROBLEM — F41.9 ANXIETY: Status: ACTIVE | Noted: 2018-03-23

## 2018-03-23 LAB
ANION GAP SERPL CALC-SCNC: 9 MMOL/L (ref 0–11.9)
BUN SERPL-MCNC: 7 MG/DL (ref 8–22)
CALCIUM SERPL-MCNC: 8.7 MG/DL (ref 8.5–10.5)
CHLORIDE SERPL-SCNC: 109 MMOL/L (ref 96–112)
CHOLEST SERPL-MCNC: 163 MG/DL (ref 100–199)
CO2 SERPL-SCNC: 22 MMOL/L (ref 20–33)
CREAT SERPL-MCNC: 0.69 MG/DL (ref 0.5–1.4)
EKG IMPRESSION: NORMAL
ERYTHROCYTE [DISTWIDTH] IN BLOOD BY AUTOMATED COUNT: 40.6 FL (ref 35.9–50)
GLUCOSE BLD-MCNC: 104 MG/DL (ref 65–99)
GLUCOSE SERPL-MCNC: 97 MG/DL (ref 65–99)
HCT VFR BLD AUTO: 41.9 % (ref 37–47)
HDLC SERPL-MCNC: 63 MG/DL
HGB BLD-MCNC: 14.1 G/DL (ref 12–16)
LDLC SERPL CALC-MCNC: 88 MG/DL
LV EJECT FRACT  99904: 60
LV EJECT FRACT MOD 2C 99903: 55.45
LV EJECT FRACT MOD 4C 99902: 65.76
LV EJECT FRACT MOD BP 99901: 60.88
MCH RBC QN AUTO: 30.9 PG (ref 27–33)
MCHC RBC AUTO-ENTMCNC: 33.7 G/DL (ref 33.6–35)
MCV RBC AUTO: 91.9 FL (ref 81.4–97.8)
PHOSPHATE SERPL-MCNC: 3.9 MG/DL (ref 2.5–4.5)
PLATELET # BLD AUTO: 220 K/UL (ref 164–446)
PMV BLD AUTO: 10.2 FL (ref 9–12.9)
POTASSIUM SERPL-SCNC: 3.7 MMOL/L (ref 3.6–5.5)
RBC # BLD AUTO: 4.56 M/UL (ref 4.2–5.4)
SODIUM SERPL-SCNC: 140 MMOL/L (ref 135–145)
TRIGL SERPL-MCNC: 61 MG/DL (ref 0–149)
WBC # BLD AUTO: 8.7 K/UL (ref 4.8–10.8)

## 2018-03-23 PROCEDURE — 80048 BASIC METABOLIC PNL TOTAL CA: CPT

## 2018-03-23 PROCEDURE — 85027 COMPLETE CBC AUTOMATED: CPT

## 2018-03-23 PROCEDURE — 82962 GLUCOSE BLOOD TEST: CPT

## 2018-03-23 PROCEDURE — 99217 PR OBSERVATION CARE DISCHARGE: CPT | Performed by: HOSPITALIST

## 2018-03-23 PROCEDURE — G0378 HOSPITAL OBSERVATION PER HR: HCPCS

## 2018-03-23 PROCEDURE — 93010 ELECTROCARDIOGRAM REPORT: CPT | Performed by: INTERNAL MEDICINE

## 2018-03-23 PROCEDURE — 93005 ELECTROCARDIOGRAM TRACING: CPT | Performed by: HOSPITALIST

## 2018-03-23 PROCEDURE — 36415 COLL VENOUS BLD VENIPUNCTURE: CPT

## 2018-03-23 PROCEDURE — 80061 LIPID PANEL: CPT

## 2018-03-23 PROCEDURE — 84100 ASSAY OF PHOSPHORUS: CPT

## 2018-03-23 RX ORDER — ALPRAZOLAM 0.25 MG/1
0.25 TABLET ORAL
Qty: 3 TAB | Refills: 0 | Status: SHIPPED | OUTPATIENT
Start: 2018-03-23 | End: 2018-03-26

## 2018-03-23 ASSESSMENT — ENCOUNTER SYMPTOMS
CLAUDICATION: 0
NAUSEA: 0
PND: 0
BLURRED VISION: 0
ABDOMINAL PAIN: 0
MYALGIAS: 0
SPUTUM PRODUCTION: 0
SENSORY CHANGE: 0
PALPITATIONS: 0
CONSTIPATION: 0
COUGH: 0
HEADACHES: 0
DEPRESSION: 0
DIAPHORESIS: 0
VOMITING: 0
CHILLS: 0
WEIGHT LOSS: 0
FOCAL WEAKNESS: 0
SHORTNESS OF BREATH: 0
HEARTBURN: 0
DIZZINESS: 0
ORTHOPNEA: 0
WEAKNESS: 0
FEVER: 0
WHEEZING: 0
DIARRHEA: 0

## 2018-03-23 ASSESSMENT — PAIN SCALES - GENERAL
PAINLEVEL_OUTOF10: 0

## 2018-03-23 NOTE — DISCHARGE SUMMARY
CHIEF COMPLAINT ON ADMISSION  Palpitations and rapid heartbeat.    HPI & HOSPITAL COURSE  This is a 48 y.o. year old female with no significant past medical history here with complaints of palpitations and rapid heartbeat which awakened her from sleep. The patient was found to have a heart rate in the 150s, determined to be SVT, and the patient was given adenosine by EMS. The patient's heart rate remained stable after that point. On admission EKG was stable and revealed NSR. Echocardiogram revealed an LVEF of 60% and was otherwise unremarkable. Cardiology did assess the patient and determined that she likely had an isolated episode of SVT, likely associated with anxiety as the patient does appear highly anxious at baseline. There is no need to start medications at this time. She wanted to benefit from an outpatient psychiatry evaluation due to her level of anxiety. The patient was monitored overnight and determined to have a stable heart rate with no further need for acute intervention. He will be discharged home in good condition at this time.    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 SVT    FOLLOW UP  The patient will follow with her PCP in 1-2 weeks after discharge.    MEDICATIONS ON DISCHARGE   Yudith Coburn   Home Medication Instructions VERONICA:[id number]    Printed on:03/23/18 3994   Medication Information                      ALPRAZolam (XANAX) 0.25 MG Tab  Take 1 Tab by mouth 1 time daily as needed for Sleep for up to 3 doses.             Calcium Carb-Cholecalciferol (CALCIUM 500 + D3 PO)  Take 1 Tab by mouth every day.             CINNAMON PO  Take 1 Tab by mouth every day.             ibuprofen (MOTRIN) 200 MG Tab  Take 400-800 mg by mouth every 6 hours as needed for Mild Pain.                 DIET  Regular diet.    ACTIVITY  As  tolerated.    CONSULTATIONS  Cardiology    LABORATORY  Lab Results   Component Value Date/Time    SODIUM 140 03/23/2018 03:50 AM    POTASSIUM 3.7 03/23/2018 03:50 AM    CHLORIDE 109 03/23/2018 03:50 AM    CO2 22 03/23/2018 03:50 AM    GLUCOSE 97 03/23/2018 03:50 AM    BUN 7 (L) 03/23/2018 03:50 AM    CREATININE 0.69 03/23/2018 03:50 AM        Lab Results   Component Value Date/Time    WBC 8.7 03/23/2018 03:50 AM    HEMOGLOBIN 14.1 03/23/2018 03:50 AM    HEMATOCRIT 41.9 03/23/2018 03:50 AM    PLATELETCT 220 03/23/2018 03:50 AM        Total time of the discharge process was 37 minutes.

## 2018-03-23 NOTE — DISCHARGE INSTRUCTIONS
Alprazolam tablets  What is this medicine?  ALPRAZOLAM (al PRAY yanni barone) is a benzodiazepine. It is used to treat anxiety and panic attacks.  This medicine may be used for other purposes; ask your health care provider or pharmacist if you have questions.  COMMON BRAND NAME(S): Xanax  What should I tell my health care provider before I take this medicine?  They need to know if you have any of these conditions:  -an alcohol or drug abuse problem  -bipolar disorder, depression, psychosis or other mental health conditions  -glaucoma  -kidney or liver disease  -lung or breathing disease  -myasthenia gravis  -Parkinson's disease  -porphyria  -seizures or a history of seizures  -suicidal thoughts  -an unusual or allergic reaction to alprazolam, other benzodiazepines, foods, dyes, or preservatives  -pregnant or trying to get pregnant  -breast-feeding  How should I use this medicine?  Take this medicine by mouth with a glass of water. Follow the directions on the prescription label. Take your medicine at regular intervals. Do not take it more often than directed. Do not stop taking except on your doctor's advice.  A special MedGuide will be given to you by the pharmacist with each prescription and refill. Be sure to read this information carefully each time.  Talk to your pediatrician regarding the use of this medicine in children. Special care may be needed.  Overdosage: If you think you have taken too much of this medicine contact a poison control center or emergency room at once.  NOTE: This medicine is only for you. Do not share this medicine with others.  What if I miss a dose?  If you miss a dose, take it as soon as you can. If it is almost time for your next dose, take only that dose. Do not take double or extra doses.  What may interact with this medicine?  Do not take this medicine with any of the following medications:  -certain antiviral medicines for HIV or AIDS like delavirdine, indinavir  -certain medicines for  fungal infections like ketoconazole and itraconazole  -narcotic medicines for cough  -sodium oxybate  This medicine may also interact with the following medications:  -alcohol  -antihistamines for allergy, cough and cold  -certain antibiotics like clarithromycin, erythromycin, isoniazid, rifampin, rifapentine, rifabutin, and troleandomycin  -certain medicines for blood pressure, heart disease, irregular heart beat  -certain medicines for depression, like amitriptyline, fluoxetine, sertraline  -certain medicines for seizures like carbamazepine, oxcarbazepine, phenobarbital, phenytoin, primidone  -cimetidine  -cyclosporine  -female hormones, like estrogens or progestins and birth control pills, patches, rings, or injections  -general anesthetics like halothane, isoflurane, methoxyflurane, propofol  -grapefruit juice  -local anesthetics like lidocaine, pramoxine, tetracaine  -medicines that relax muscles for surgery  -narcotic medicines for pain  -other antiviral medicines for HIV or AIDS  -phenothiazines like chlorpromazine, mesoridazine, prochlorperazine, thioridazine  This list may not describe all possible interactions. Give your health care provider a list of all the medicines, herbs, non-prescription drugs, or dietary supplements you use. Also tell them if you smoke, drink alcohol, or use illegal drugs. Some items may interact with your medicine.  What should I watch for while using this medicine?  Tell your doctor or health care professional if your symptoms do not start to get better or if they get worse.  Do not stop taking except on your doctor's advice. You may develop a severe reaction. Your doctor will tell you how much medicine to take.  You may get drowsy or dizzy. Do not drive, use machinery, or do anything that needs mental alertness until you know how this medicine affects you. To reduce the risk of dizzy and fainting spells, do not stand or sit up quickly, especially if you are an older patient.  Alcohol may increase dizziness and drowsiness. Avoid alcoholic drinks.  If you are taking another medicine that also causes drowsiness, you may have more side effects. Give your health care provider a list of all medicines you use. Your doctor will tell you how much medicine to take. Do not take more medicine than directed. Call emergency for help if you have problems breathing or unusual sleepiness.  What side effects may I notice from receiving this medicine?  Side effects that you should report to your doctor or health care professional as soon as possible:  -allergic reactions like skin rash, itching or hives, swelling of the face, lips, or tongue  -breathing problems  -confusion  -loss of balance or coordination  -signs and symptoms of low blood pressure like dizziness; feeling faint or lightheaded, falls; unusually weak or tired  -suicidal thoughts or other mood changes  Side effects that usually do not require medical attention (report to your doctor or health care professional if they continue or are bothersome):  -dizziness  -dry mouth  -nausea, vomiting  -tiredness  This list may not describe all possible side effects. Call your doctor for medical advice about side effects. You may report side effects to FDA at 5-316-FDA-7530.  Where should I keep my medicine?  Keep out of the reach of children. This medicine can be abused. Keep your medicine in a safe place to protect it from theft. Do not share this medicine with anyone. Selling or giving away this medicine is dangerous and against the law.  Store at room temperature between 20 and 25 degrees C (68 and 77 degrees F). This medicine may cause accidental overdose and death if taken by other adults, children, or pets. Mix any unused medicine with a substance like cat litter or coffee grounds. Then throw the medicine away in a sealed container like a sealed bag or a coffee can with a lid. Do not use the medicine after the expiration date.  NOTE: This sheet is  a summary. It may not cover all possible information. If you have questions about this medicine, talk to your doctor, pharmacist, or health care provider.  © 2018 [employer]vier/Gold Standard (2016-09-15 13:47:25)  Discharge Instructions    Discharged to home by car with relative. Discharged via walking, hospital escort: Yes.  Special equipment needed: Not Applicable    Be sure to schedule a follow-up appointment with your primary care doctor or any specialists as instructed.     Discharge Plan:   Diet Plan: Discussed  Activity Level: Discussed  Confirmed Follow up Appointment: Patient to Call and Schedule Appointment  Confirmed Symptoms Management: Discussed  Medication Reconciliation Updated: Yes  Influenza Vaccine Indication: Patient Refuses    I understand that a diet low in cholesterol, fat, and sodium is recommended for good health. Unless I have been given specific instructions below for another diet, I accept this instruction as my diet prescription.   Other diet:     Special Instructions: None    · Is patient discharged on Warfarin / Coumadin?   No   Supraventricular Tachycardia, Adult  Supraventricular tachycardia (SVT) is a kind of abnormal heartbeat. It makes your heart beat very fast and then beat at a normal speed.  A normal heart beats  times a minute. This condition can make your heart beat more than 150 times a minute. Times of having a fast heartbeat (episodes) can be scary, but they are usually not dangerous. They can lead to problems if:  · They happen often.  · They last a long time.  Symptoms of this condition include:  · A pounding heart.  · A feeling that your heart is skipping beats (palpitations).  · Weakness.  · Trouble getting enough air (shortness of breath).  · Pain or tightness in your chest.  · Feeling like you are going to pass out (light-headedness).  · Feeling worried or nervous (anxiety).  · Dizziness.  · Sweating.  · Feeling sick to your stomach (nausea).  · Passing out  "(fainting).  · Tiredness.  Sometimes, there are no symptoms.  Follow these instructions at home:  Stress  · Avoid things that make you feel stressed.  · Find out what helps you feel less stressed. Try:  ¨ Doing a relaxing activity, like yoga, meditation, or being out in nature.  ¨ Listening to relaxing music.  ¨ Doing relaxation techniques, like deep breathing.  ¨ Taking steps to be healthy. These include getting lots of sleep, exercising, and eating a balanced diet.  ¨ Talking with a mental health doctor.  Sleep  · Try to get at least 7 hours of sleep each night.  Tobacco and nicotine  · Do not use anything that has nicotine or tobacco, such as cigarettes and e-cigarettes. If you need help quitting, ask your doctor.  Alcohol  · If alcohol gives you a fast heartbeat, do not drink alcohol.  · If alcohol does not seem to give you a fast heartbeat, limit your alcohol. For nonpregnant women, this means no more than 1 drink a day. For men, this means no more than 2 drinks a day. \"One drink\" means one of these:  ¨ 12 oz of beer.  ¨ 5 oz of wine.  ¨ 1½ oz of hard liquor.  Caffeine  · If caffeine gives you a fast heartbeat, do not eat, drink, or use anything with caffeine in it.  · If caffeine does not seem to give you a fast heartbeat, limit how much caffeine you eat, drink, or use.  Stimulant drugs  · Do not use stimulant drugs. These are drugs like cocaine or methamphetamine. If you need help quitting, ask your doctor.  General instructions  · Stay at a healthy weight.  · Exercise regularly. Ask your doctor to suggest some good activities for you. Try one of these options:  ¨ 150 minutes a week of gentle exercise, like walking or yoga.  ¨ 75 minutes a week of exercise that is very active, like running or swimming.  ¨ A combination of gentle exercise and very active exercise.  · Do home treatments to slow down your heartbeat as told by your doctor.  · Take over-the-counter and prescription medicines only as told by your " doctor.  Contact a doctor if:  · You have a fast heartbeat more often.  · Times of having a fast heartbeat last longer than before.  · Your home treatments to slow down your heartbeat do not help.  · You have new symptoms.  Get help right away if:  · You have chest pain.  · Your symptoms get worse.  · You have trouble breathing.  · Your heart beats very fast for more than 20 minutes.  · You pass out (faint).  These symptoms may be an emergency. Do not wait to see if the symptoms will go away. Get medical help right away. Call your local emergency services (911 in the [place] Do not drive yourself to the hospital.   This information is not intended to replace advice given to you by your health care provider. Make sure you discuss any questions you have with your health care provider.  Document Released: 12/18/2006 Document Revised: 08/24/2017 Document Reviewed: 08/24/2017  [employer] Interactive Patient Education © 2017 [employer]      Depression / Suicide Risk    As you are discharged from this [facility] facility, it is important to learn how to keep safe from harming yourself.    Recognize the warning signs:  · Abrupt changes in personality, positive or negative- including increase in energy   · Giving away possessions  · Change in eating patterns- significant weight changes-  positive or negative  · Change in sleeping patterns- unable to sleep or sleeping all the time   · Unwillingness or inability to communicate  · Depression  · Unusual sadness, discouragement and loneliness  · Talk of wanting to die  · Neglect of personal appearance   · Rebelliousness- reckless behavior  · Withdrawal from people/activities they love  · Confusion- inability to concentrate     If you or a loved one observes any of these behaviors or has concerns about self-harm, here's what you can do:  · Talk about it- your feelings and reasons for harming yourself  · Remove any means that you might use to hurt yourself (examples: pills,  rope, extension cords, firearm)  · Get professional help from the community (Mental Health, Substance Abuse, psychological counseling)  · Do not be alone:Call your Safe Contact- someone whom you trust who will be there for you.  · Call your local CRISIS HOTLINE 344-2349 or 769-924-7601  · Call your local Children's Mobile Crisis Response Team [place] (486) 578-2497 or www.Chute  · Call the toll free National Suicide Prevention Hotlines   · National Suicide Prevention Lifeline 063-234-DHXE (9989)  · National Hope Line Network 800-SUICIDE (893-5491)

## 2018-03-23 NOTE — PROGRESS NOTES
A/o,assessment completed,poc discussed,verbalized understanding,family at bedside visiting at changed of shift,call button within reach,denies cp ,palpitaions  But pt getting more anxious now that family went home,ask if pt wants anti- anxiety meds,pt said will try,on call paged,will continue to monitor.

## 2018-03-23 NOTE — PROGRESS NOTES
Pt very anxious,c/o chest pain,refused pain medicine,anxiety medicine,pt states it is only discomfort,poc explained,tele with SR,pt states no heart pain it is chest muscles.

## 2018-03-23 NOTE — CONSULTS
DATE OF CONSULTATION: 3/22/18    REASON FOR CONSULTATION: SVT    Interval history:  Denies any palpitations, dizziness, chest pain, or orthopnea.    PAST MEDICAL HISTORY:  History reviewed. No pertinent past medical history.      SURGICAL HISTORY:  Past Surgical History:   Procedure Laterality Date   • APPENDECTOMY     • GYN SURGERY         • OTHER ORTHOPEDIC SURGERY      right hip         SOCIAL HISTORY:  Social History     Social History   • Marital status:      Spouse name: N/A   • Number of children: N/A   • Years of education: N/A     Social History Main Topics   • Smoking status: Never Smoker   • Smokeless tobacco: Never Used   • Alcohol use Yes      Comment: occ   • Drug use: No   • Sexual activity: Not on file     Other Topics Concern   • Not on file     Social History Narrative   • No narrative on file         FAMILY HISTORY:  History reviewed. No pertinent family history.  Grandfather: CAD,  of MI @age of 65.    HOME MEDICATIONS:   Prior to Admission medications    Medication Sig Start Date End Date Taking? Authorizing Provider   Calcium Carb-Cholecalciferol (CALCIUM 500 + D3 PO) Take 1 Tab by mouth every day.   Yes Physician Outpatient   CINNAMON PO Take 1 Tab by mouth every day.   Yes Physician Outpatient   ibuprofen (MOTRIN) 200 MG Tab Take 400-800 mg by mouth every 6 hours as needed for Mild Pain.   Yes Physician Outpatient         REVIEW OF SYSTEMS:  Review of Systems   Constitutional: Negative for chills, diaphoresis, fever and weight loss.   HENT: Negative for hearing loss.    Eyes: Negative for blurred vision.   Respiratory: Negative for cough, sputum production, shortness of breath and wheezing.    Cardiovascular: Negative for chest pain, palpitations, orthopnea, claudication, leg swelling and PND.   Gastrointestinal: Negative for abdominal pain, constipation, diarrhea, heartburn, nausea and vomiting.   Genitourinary: Negative for dysuria.   Musculoskeletal: Negative for  "myalgias.   Skin: Negative for rash.   Neurological: Negative for dizziness, sensory change, focal weakness, weakness and headaches.   Psychiatric/Behavioral: Negative for depression.       PHYSICAL EXAMINATION:  Vitals:    03/23/18 0345 03/23/18 0539 03/23/18 0735 03/23/18 1133   BP: 123/67 116/72 110/63 121/66   Pulse: 79 92 87 77   Resp: 18 20 19 18   Temp: 36.9 °C (98.5 °F) 36.6 °C (97.9 °F) 37.2 °C (98.9 °F) 37.6 °C (99.6 °F)   SpO2: 98%  98% 97%   Weight:       Height:         Body mass index is 23.7 kg/m².  /66   Pulse 77   Temp 37.6 °C (99.6 °F)   Resp 18   Ht 1.651 m (5' 5\")   Wt 64.6 kg (142 lb 6.7 oz)   LMP 02/27/2018   SpO2 97%   Breastfeeding? No   BMI 23.70 kg/m²   O2 therapy: Pulse Oximetry: 97 %, O2 (LPM): 0, O2 Delivery: None (Room Air)    Physical Exam   Constitutional: She is oriented to person, place, and time and well-developed, well-nourished, and in no distress. No distress.   HENT:   Head: Normocephalic and atraumatic.   Eyes: EOM are normal. Pupils are equal, round, and reactive to light.   Neck: Normal range of motion. No JVD present. No thyromegaly present.   Cardiovascular: Regular rhythm and normal heart sounds.  Exam reveals no gallop and no friction rub.    No murmur heard.  Pulmonary/Chest: Effort normal. No respiratory distress. She has no wheezes. She has no rales. She exhibits no tenderness.   Abdominal: Soft. Bowel sounds are normal. She exhibits no distension and no mass. There is no tenderness. There is no rebound and no guarding.   Musculoskeletal: Normal range of motion. She exhibits no edema or tenderness.   Neurological: She is alert and oriented to person, place, and time. GCS score is 15.   Skin: Skin is warm and dry. She is not diaphoretic.   Psychiatric:   Anxious       LABORATORY DATA:     Recent Results (from the past 24 hour(s))   ECHOCARDIOGRAM COMP W/O CONT    Collection Time: 03/22/18  7:51 PM   Result Value Ref Range    Eject.Frac. MOD BP 60.88     " Eject.Frac. MOD 4C 65.76     Eject.Frac. MOD 2C 55.45     Left Ventrical Ejection Fraction 60    CBC without Differential    Collection Time: 18  3:50 AM   Result Value Ref Range    WBC 8.7 4.8 - 10.8 K/uL    RBC 4.56 4.20 - 5.40 M/uL    Hemoglobin 14.1 12.0 - 16.0 g/dL    Hematocrit 41.9 37.0 - 47.0 %    MCV 91.9 81.4 - 97.8 fL    MCH 30.9 27.0 - 33.0 pg    MCHC 33.7 33.6 - 35.0 g/dL    RDW 40.6 35.9 - 50.0 fL    Platelet Count 220 164 - 446 K/uL    MPV 10.2 9.0 - 12.9 fL   Basic Metabolic Panel (BMP)    Collection Time: 18  3:50 AM   Result Value Ref Range    Sodium 140 135 - 145 mmol/L    Potassium 3.7 3.6 - 5.5 mmol/L    Chloride 109 96 - 112 mmol/L    Co2 22 20 - 33 mmol/L    Glucose 97 65 - 99 mg/dL    Bun 7 (L) 8 - 22 mg/dL    Creatinine 0.69 0.50 - 1.40 mg/dL    Calcium 8.7 8.5 - 10.5 mg/dL    Anion Gap 9.0 0.0 - 11.9   Lipid Profile (Lipid Panel) Fasting    Collection Time: 18  3:50 AM   Result Value Ref Range    Cholesterol,Tot 163 100 - 199 mg/dL    Triglycerides 61 0 - 149 mg/dL    HDL 63 >=40 mg/dL    LDL 88 <100 mg/dL   ESTIMATED GFR    Collection Time: 18  3:50 AM   Result Value Ref Range    GFR If African American >60 >60 mL/min/1.73 m 2    GFR If Non African American >60 >60 mL/min/1.73 m 2   PHOSPHORUS    Collection Time: 18  3:50 AM   Result Value Ref Range    Phosphorus 3.9 2.5 - 4.5 mg/dL   ACCU-CHEK GLUCOSE    Collection Time: 18  5:47 AM   Result Value Ref Range    Glucose - Accu-Ck 104 (H) 65 - 99 mg/dL   EKG (IP)    Collection Time: 18  6:48 AM   Result Value Ref Range    Report       [facility]    Test Date:  2018  Pt Name:    RADHA GUAMAN              Department: CPU  MRN:        [id number]                      Room:       T215  Gender:     Female                       Technician: [facility]  :        1970                   Requested By:CAROLIN ROSS  Order #:    [id number]                    Reading  MD:    Measurements  Intervals                                Axis  Rate:       89                           P:          46  KS:         144                          QRS:        -82  QRSD:       86                           T:          31  QT:         388  QTc:        473    Interpretive Statements  SINUS RHYTHM  LEFT AXIS DEVIATION  Compared to ECG 03/22/2018 09:41:08  Left-axis deviation now present  Sinus tachycardia no longer present  Right-axis deviation no longer present         ECHOCARDIOGRAM COMP W/O CONT   Final Result      DX-CHEST-LIMITED (1 VIEW)   Final Result      No acute cardiopulmonary process is identified.          ASSESSMENT AND PLAN:     //SVT  //Sinus tachycardia   //Anxiety    - Converted using Adenosine in REMSA  - Do not recommend chronic suppressive therapy. This was an isolated episode.   - Transthoracic echocardiogram negative for structural heart disease.  - Tachycardic in the ED likely related to anxiety. Resolved.  - TSH wnl.   -Recommend PCP follow-up and outpatient psychology referral.    Thank you for the consult. Cardiology will sign off.    Quality Measures  Quality-Core Measures

## 2018-03-23 NOTE — DISCHARGE PLANNING
Care Transition Team Assessment    Information Source  Orientation : Oriented x 4  Information Given By: Patient         Elopement Risk  Legal Hold: No  Ambulatory or Self Mobile in Wheelchair: No-Not an Elopement Risk  Elopement Risk: Not at Risk for Elopement    Interdisciplinary Discharge Planning  Does Admitting Nurse Feel This Could be a Complex Discharge?: No  Lives with - Patient's Self Care Capacity: Spouse  Patient or legal guardian wants to designate a caregiver (see row info): No  Support Systems: Family Member(s)  Housing / Facility: [address]  Do You Take your Prescribed Medications Regularly: No  Able to Return to Previous ADL's: Yes  Mobility Issues: No  Prior Services: None  Patient Expects to be Discharged to:: home  Assistance Needed: No  Durable Medical Equipment: Not Applicable                   Vision / Hearing Impairment  Vision Impairment : Yes  Hearing Impairment : No    Values / Beliefs / Concerns  Special Hospitalization Concerns: none         Domestic Abuse  Have you ever been the victim of abuse or violence?: Not Sure  Physical Abuse or Sexual Abuse: No  Verbal Abuse or Emotional Abuse: No  Possible Abuse Reported to:: Not Applicable

## 2018-03-23 NOTE — PROGRESS NOTES
Pt brought from ER in ,on arrival pt awake a&ox4,looks anxious,pt states the palpitation makes her anxious,on tele with tachycardia,no c/o chest pain and dizziness,ivf started,poc explained.

## 2018-03-23 NOTE — PROGRESS NOTES
Pt states feelin funny,shaky,legs are shaking,chest tender,pt very anxious, vitals 116/72 hr= 85 SR, sating 97% on  RA ,will notify MD.

## 2018-03-23 NOTE — ASSESSMENT & PLAN NOTE
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 Stable now back to NSR after adenosine  Cardiologist consulted.  Checking her electrolytes, getting echo. Will repeat ekg in am.

## 2018-04-26 ENCOUNTER — OFFICE VISIT (OUTPATIENT)
Dept: CARDIOLOGY | Facility: MEDICAL CENTER | Age: 48
End: 2018-04-26
Payer: COMMERCIAL

## 2018-04-26 VITALS
DIASTOLIC BLOOD PRESSURE: 70 MMHG | HEART RATE: 82 BPM | HEIGHT: 65 IN | OXYGEN SATURATION: 98 % | BODY MASS INDEX: 23.49 KG/M2 | WEIGHT: 141 LBS | SYSTOLIC BLOOD PRESSURE: 122 MMHG

## 2018-04-26 DIAGNOSIS — I47.10 SVT (SUPRAVENTRICULAR TACHYCARDIA): ICD-10-CM

## 2018-04-26 DIAGNOSIS — I47.19 AVNRT (AV NODAL RE-ENTRY TACHYCARDIA): ICD-10-CM

## 2018-04-26 LAB — EKG IMPRESSION: NORMAL

## 2018-04-26 PROCEDURE — 93000 ELECTROCARDIOGRAM COMPLETE: CPT | Performed by: INTERNAL MEDICINE

## 2018-04-26 PROCEDURE — 99204 OFFICE O/P NEW MOD 45 MIN: CPT | Mod: 25 | Performed by: INTERNAL MEDICINE

## 2018-04-26 ASSESSMENT — ENCOUNTER SYMPTOMS
WEAKNESS: 0
RESPIRATORY NEGATIVE: 1
SPUTUM PRODUCTION: 0
CONSTITUTIONAL NEGATIVE: 1
WHEEZING: 0
EYES NEGATIVE: 1
GASTROINTESTINAL NEGATIVE: 1
PND: 0
FEVER: 0
COUGH: 0
PALPITATIONS: 1
SORE THROAT: 0
LOSS OF CONSCIOUSNESS: 0
DIZZINESS: 0
NEUROLOGICAL NEGATIVE: 1
MUSCULOSKELETAL NEGATIVE: 1
ORTHOPNEA: 0
HEMOPTYSIS: 0
CLAUDICATION: 0
STRIDOR: 0
BRUISES/BLEEDS EASILY: 0
CHILLS: 0
SHORTNESS OF BREATH: 0

## 2018-04-26 NOTE — LETTER
[facility]   [address], [facility] [address]  [place] [address]  Phone: [phone]  Fax: [phone]              Yudith Coburn  1970    Encounter Date: 2018    Branden Cordon M.D.          PROGRESS NOTE:  Chief Complaint   Patient presents with   • Supraventricular Tachycardia (SVT)       Subjective:   Yudith Coburn is a 48 y.o. female who presents today as a new consultation. She was in her normal state of health when she developed a rapid tachycardia at home. She went to urgent care where she was found to have a heart rate in the 150s to 170s. She was put in the [facility] ambulance and transported to the ER and during transport received 6 mg followed by 12 mg of adenosine. She had an acute break in her arrhythmia and went into sinus tachycardia at a rate of about 105. She had an echocardiogram during her hospitalization which was normal. She didn't have any recurrence. She was discharged with no complications. This is the first time this is happened. Since then she does have times where she checks her heart and feels palpitations and notices a tachycardia but only upwards about 100 to high 90s for her heart rate. She is physically active has stopped drinking alcohol and caffeine.    History reviewed. No pertinent past medical history.  Past Surgical History:   Procedure Laterality Date   • APPENDECTOMY     • GYN SURGERY         • OTHER ORTHOPEDIC SURGERY      right hip     History reviewed. No pertinent family history.  Social History     Social History   • Marital status:      Spouse name: N/A   • Number of children: N/A   • Years of education: N/A     Occupational History   • Not on file.     Social History Main Topics   • Smoking status: Never Smoker   • Smokeless tobacco: Never Used   • Alcohol use Yes      Comment: occ   • Drug use: No   • Sexual activity: Not on file     Other Topics Concern   • Not on file     Social History  "Narrative   • No narrative on file     Allergies   Allergen Reactions   • Hydrocodone Itching     \"itching\"      Outpatient Encounter Prescriptions as of 4/26/2018   Medication Sig Dispense Refill   • metoprolol (LOPRESSOR) 25 MG Tab Take 1 Tab by mouth as needed. Take 1 pill as needed for rapid heart rate 60 Tab 11   • Calcium Carb-Cholecalciferol (CALCIUM 500 + D3 PO) Take 1 Tab by mouth every day.     • CINNAMON PO Take 1 Tab by mouth every day.     • ibuprofen (MOTRIN) 200 MG Tab Take 400-800 mg by mouth every 6 hours as needed for Mild Pain.       No facility-administered encounter medications on file as of 4/26/2018.      Review of Systems   Constitutional: Negative.  Negative for chills, fever and malaise/fatigue.   HENT: Negative.  Negative for sore throat.    Eyes: Negative.    Respiratory: Negative.  Negative for cough, hemoptysis, sputum production, shortness of breath, wheezing and stridor.    Cardiovascular: Positive for palpitations. Negative for chest pain, orthopnea, claudication, leg swelling and PND.   Gastrointestinal: Negative.    Genitourinary: Negative.    Musculoskeletal: Negative.    Skin: Negative.    Neurological: Negative.  Negative for dizziness, loss of consciousness and weakness.   Endo/Heme/Allergies: Negative.  Does not bruise/bleed easily.   All other systems reviewed and are negative.       Objective:   /70   Pulse 82   Ht 1.651 m (5' 5\")   Wt 64 kg (141 lb)   SpO2 98%   BMI 23.46 kg/m²      Physical Exam   Constitutional: She appears well-developed and well-nourished. No distress.   HENT:   Head: Normocephalic and atraumatic.   Right Ear: External ear normal.   Left Ear: External ear normal.   Nose: Nose normal.   Mouth/Throat: No oropharyngeal exudate.   Eyes: Conjunctivae and EOM are normal. Pupils are equal, round, and reactive to light. Right eye exhibits no discharge. Left eye exhibits no discharge. No scleral icterus.   Neck: Neck supple. No JVD present.   "   Cardiovascular: Normal rate, regular rhythm and intact distal pulses.  Exam reveals no gallop and no friction rub.    No murmur heard.  Pulmonary/Chest: Effort normal. No stridor. No respiratory distress. She has no wheezes. She has no rales. She exhibits no tenderness.   Abdominal: Soft. She exhibits no distension. There is no guarding.   Musculoskeletal: Normal range of motion. She exhibits no edema, tenderness or deformity.   Neurological: She is alert. She has normal reflexes. She displays normal reflexes. No cranial nerve deficit. She exhibits normal muscle tone. Coordination normal.   Skin: Skin is warm and dry. No rash noted. She is not diaphoretic. No erythema. No pallor.   Psychiatric: She has a normal mood and affect. Her behavior is normal. Judgment and thought content normal.   Nursing note and vitals reviewed.    EKG: Personally reviewed by myself showing Normal sinus rhythm, normal EKG    Assessment:     1. SVT (supraventricular tachycardia) (CMS-HCC)  EKG    metoprolol (LOPRESSOR) 25 MG Tab   2. AVNRT (AV nevaeh re-entry tachycardia) (CMS-HCC)  metoprolol (LOPRESSOR) 25 MG Tab    [facility] HM / Event Monitor       Medical Decision Making:  Today's Assessment / Status / Plan:     48-year-old female with what sounds like AV nevaeh reentrant tachycardia which broke with a single dose of adenosine. We discussed the risks benefits alternatives of direct referral for an ablation procedure. Since this is her first instance of this happening she would like to hold off for now. I will give her metoprolol 25 mg to be taken as needed when she gets rapid heart rate. We will also set her up for an outpatient event recorder. I will see her back in 3 months.    Thank for you allowing me to take part in your patient's care, please call should you have any questions or would like to discuss this patient.      Ling Valladares M.D.  [address]  [facility] [address]  VIA Facsimile: [phone]

## 2018-04-26 NOTE — PROGRESS NOTES
"Chief Complaint   Patient presents with   • Supraventricular Tachycardia (SVT)       Subjective:   Yudith Coburn is a 48 y.o. female who presents today as a new consultation. She was in her normal state of health when she developed a rapid tachycardia at home. She went to urgent care where she was found to have a heart rate in the 150s to 170s. She was put in the [facility] ambulance and transported to the ER and during transport received 6 mg followed by 12 mg of adenosine. She had an acute break in her arrhythmia and went into sinus tachycardia at a rate of about 105. She had an echocardiogram during her hospitalization which was normal. She didn't have any recurrence. She was discharged with no complications. This is the first time this is happened. Since then she does have times where she checks her heart and feels palpitations and notices a tachycardia but only upwards about 100 to high 90s for her heart rate. She is physically active has stopped drinking alcohol and caffeine.    History reviewed. No pertinent past medical history.  Past Surgical History:   Procedure Laterality Date   • APPENDECTOMY     • GYN SURGERY         • OTHER ORTHOPEDIC SURGERY      right hip     History reviewed. No pertinent family history.  Social History     Social History   • Marital status:      Spouse name: N/A   • Number of children: N/A   • Years of education: N/A     Occupational History   • Not on file.     Social History Main Topics   • Smoking status: Never Smoker   • Smokeless tobacco: Never Used   • Alcohol use Yes      Comment: occ   • Drug use: No   • Sexual activity: Not on file     Other Topics Concern   • Not on file     Social History Narrative   • No narrative on file     Allergies   Allergen Reactions   • Hydrocodone Itching     \"itching\"      Outpatient Encounter Prescriptions as of 2018   Medication Sig Dispense Refill   • metoprolol (LOPRESSOR) 25 MG Tab Take 1 Tab by mouth as needed. Take 1 " "pill as needed for rapid heart rate 60 Tab 11   • Calcium Carb-Cholecalciferol (CALCIUM 500 + D3 PO) Take 1 Tab by mouth every day.     • CINNAMON PO Take 1 Tab by mouth every day.     • ibuprofen (MOTRIN) 200 MG Tab Take 400-800 mg by mouth every 6 hours as needed for Mild Pain.       No facility-administered encounter medications on file as of 4/26/2018.      Review of Systems   Constitutional: Negative.  Negative for chills, fever and malaise/fatigue.   HENT: Negative.  Negative for sore throat.    Eyes: Negative.    Respiratory: Negative.  Negative for cough, hemoptysis, sputum production, shortness of breath, wheezing and stridor.    Cardiovascular: Positive for palpitations. Negative for chest pain, orthopnea, claudication, leg swelling and PND.   Gastrointestinal: Negative.    Genitourinary: Negative.    Musculoskeletal: Negative.    Skin: Negative.    Neurological: Negative.  Negative for dizziness, loss of consciousness and weakness.   Endo/Heme/Allergies: Negative.  Does not bruise/bleed easily.   All other systems reviewed and are negative.       Objective:   /70   Pulse 82   Ht 1.651 m (5' 5\")   Wt 64 kg (141 lb)   SpO2 98%   BMI 23.46 kg/m²     Physical Exam   Constitutional: She appears well-developed and well-nourished. No distress.   HENT:   Head: Normocephalic and atraumatic.   Right Ear: External ear normal.   Left Ear: External ear normal.   Nose: Nose normal.   Mouth/Throat: No oropharyngeal exudate.   Eyes: Conjunctivae and EOM are normal. Pupils are equal, round, and reactive to light. Right eye exhibits no discharge. Left eye exhibits no discharge. No scleral icterus.   Neck: Neck supple. No JVD present.   Cardiovascular: Normal rate, regular rhythm and intact distal pulses.  Exam reveals no gallop and no friction rub.    No murmur heard.  Pulmonary/Chest: Effort normal. No stridor. No respiratory distress. She has no wheezes. She has no rales. She exhibits no tenderness. "   Abdominal: Soft. She exhibits no distension. There is no guarding.   Musculoskeletal: Normal range of motion. She exhibits no edema, tenderness or deformity.   Neurological: She is alert. She has normal reflexes. She displays normal reflexes. No cranial nerve deficit. She exhibits normal muscle tone. Coordination normal.   Skin: Skin is warm and dry. No rash noted. She is not diaphoretic. No erythema. No pallor.   Psychiatric: She has a normal mood and affect. Her behavior is normal. Judgment and thought content normal.   Nursing note and vitals reviewed.    EKG: Personally reviewed by myself showing Normal sinus rhythm, normal EKG    Assessment:     1. SVT (supraventricular tachycardia) (CMS-HCC)  EKG    metoprolol (LOPRESSOR) 25 MG Tab   2. AVNRT (AV nevaeh re-entry tachycardia) (CMS-HCC)  metoprolol (LOPRESSOR) 25 MG Tab    [facility] HM / Event Monitor       Medical Decision Making:  Today's Assessment / Status / Plan:     48-year-old female with what sounds like AV nevaeh reentrant tachycardia which broke with a single dose of adenosine. We discussed the risks benefits alternatives of direct referral for an ablation procedure. Since this is her first instance of this happening she would like to hold off for now. I will give her metoprolol 25 mg to be taken as needed when she gets rapid heart rate. We will also set her up for an outpatient event recorder. I will see her back in 3 months.    Thank for you allowing me to take part in your patient's care, please call should you have any questions or would like to discuss this patient.

## 2018-05-10 ENCOUNTER — TELEPHONE (OUTPATIENT)
Dept: CARDIOLOGY | Facility: MEDICAL CENTER | Age: 48
End: 2018-05-10

## 2018-05-10 ENCOUNTER — NON-PROVIDER VISIT (OUTPATIENT)
Dept: CARDIOLOGY | Facility: MEDICAL CENTER | Age: 48
End: 2018-05-10
Attending: INTERNAL MEDICINE
Payer: COMMERCIAL

## 2018-05-10 DIAGNOSIS — I47.19 AVNRT (AV NODAL RE-ENTRY TACHYCARDIA): ICD-10-CM

## 2018-05-10 DIAGNOSIS — I48.91 ATRIAL FIBRILLATION, UNSPECIFIED TYPE (HCC): ICD-10-CM

## 2018-05-10 DIAGNOSIS — I47.10 SVT (SUPRAVENTRICULAR TACHYCARDIA): ICD-10-CM

## 2018-06-07 PROCEDURE — 0296T PR EXT ECG > 48HR TO 21 DAY RCRD W/CONECT INTL RCRD: CPT | Performed by: INTERNAL MEDICINE

## 2018-06-07 PROCEDURE — 0298T PR EXT ECG > 48HR TO 21 DAY REVIEW AND INTERPRETATN: CPT | Performed by: INTERNAL MEDICINE

## 2018-06-13 ENCOUNTER — TELEPHONE (OUTPATIENT)
Dept: CARDIOLOGY | Facility: MEDICAL CENTER | Age: 48
End: 2018-06-13

## 2018-06-13 NOTE — TELEPHONE ENCOUNTER
zio patch results   Received: Today   Message Contents   Brittney Loving R.N.   Phone Number: [phone]             WYATT/jerri Lopez calling for zio patch results, please call Yudith at [phone]      Narrative     13 runs of SVT, appearing like atrial fibrillation with the longest being 4 min 1 sec in duration.  No pauses or VT.     Contacted patient, discussed results and confirmed August  appointment. Patient states she has felt rapid heart rates, but they have always self corrected.  She has not had to take any metoprolol yet.    Discussed side effects of metoprolol and if rapid heart rate is sustained and does not lower with medication initiate EMS. Patient verbalizes understanding.

## 2018-08-01 ENCOUNTER — HOSPITAL ENCOUNTER (OUTPATIENT)
Dept: RADIOLOGY | Facility: MEDICAL CENTER | Age: 48
End: 2018-08-01
Attending: OBSTETRICS & GYNECOLOGY
Payer: COMMERCIAL

## 2018-08-01 DIAGNOSIS — Z12.31 VISIT FOR SCREENING MAMMOGRAM: ICD-10-CM

## 2018-08-01 PROCEDURE — 77067 SCR MAMMO BI INCL CAD: CPT

## 2018-10-15 ENCOUNTER — OFFICE VISIT (OUTPATIENT)
Dept: CARDIOLOGY | Facility: MEDICAL CENTER | Age: 48
End: 2018-10-15
Payer: COMMERCIAL

## 2018-10-15 VITALS
HEART RATE: 73 BPM | BODY MASS INDEX: 23.63 KG/M2 | HEIGHT: 65 IN | WEIGHT: 141.8 LBS | SYSTOLIC BLOOD PRESSURE: 108 MMHG | OXYGEN SATURATION: 100 % | DIASTOLIC BLOOD PRESSURE: 70 MMHG

## 2018-10-15 DIAGNOSIS — I47.19 AVNRT (AV NODAL RE-ENTRY TACHYCARDIA): ICD-10-CM

## 2018-10-15 DIAGNOSIS — I47.10 SVT (SUPRAVENTRICULAR TACHYCARDIA): ICD-10-CM

## 2018-10-15 DIAGNOSIS — F41.9 ANXIETY: ICD-10-CM

## 2018-10-15 DIAGNOSIS — E83.39 HYPOPHOSPHATASIA: ICD-10-CM

## 2018-10-15 PROCEDURE — 99214 OFFICE O/P EST MOD 30 MIN: CPT | Performed by: INTERNAL MEDICINE

## 2018-10-15 ASSESSMENT — ENCOUNTER SYMPTOMS
NEUROLOGICAL NEGATIVE: 1
CARDIOVASCULAR NEGATIVE: 1
CONSTITUTIONAL NEGATIVE: 1
RESPIRATORY NEGATIVE: 1
PALPITATIONS: 0
CLAUDICATION: 0
SPUTUM PRODUCTION: 0
CHILLS: 0
MUSCULOSKELETAL NEGATIVE: 1
SORE THROAT: 0
FEVER: 0
WEAKNESS: 0
COUGH: 0
SHORTNESS OF BREATH: 0
PND: 0
EYES NEGATIVE: 1
ORTHOPNEA: 0
WHEEZING: 0
BRUISES/BLEEDS EASILY: 0
DIZZINESS: 0
GASTROINTESTINAL NEGATIVE: 1
LOSS OF CONSCIOUSNESS: 0
STRIDOR: 0
HEMOPTYSIS: 0

## 2018-10-15 NOTE — LETTER
"     [facility]   [address] [place] [address]  [place] [address]  Phone: [phone]  Fax: [phone]              Yudith Coburn  1970    Encounter Date: 10/15/2018    Branden Cordon M.D.          PROGRESS NOTE:  Chief Complaint   Patient presents with   • Congestive Heart Failure     supraventricular tachycardia       Subjective:   Yudith Coburn is a 48 y.o. female who presents today as a follow-up for her supraventricular tachycardia.  She did wear a event recorder that showed 2 different events both of which appeared consistent with either an SVT or atrial tachycardia.  Since then she has had only a couple of events.  She will also be concerned because she be sitting at home watching TV and her heart rate will jump from the 60s to the 80s.  She knows this because she feels like her heart going fast and she wears of fitness monitor.  Otherwise no chest pain palpitations or shortness of breath.    History reviewed. No pertinent past medical history.  Past Surgical History:   Procedure Laterality Date   • APPENDECTOMY     • GYN SURGERY         • OTHER ORTHOPEDIC SURGERY      right hip     History reviewed. No pertinent family history.  Social History     Social History   • Marital status:      Spouse name: N/A   • Number of children: N/A   • Years of education: N/A     Occupational History   • Not on file.     Social History Main Topics   • Smoking status: Never Smoker   • Smokeless tobacco: Never Used   • Alcohol use Yes      Comment: occ   • Drug use: No   • Sexual activity: Not on file     Other Topics Concern   • Not on file     Social History Narrative   • No narrative on file     Allergies   Allergen Reactions   • Hydrocodone Itching     \"itching\"      Outpatient Encounter Prescriptions as of 10/15/2018   Medication Sig Dispense Refill   • Calcium Carb-Cholecalciferol (CALCIUM 500 + D3 PO) Take 1 Tab by mouth every day.     • metoprolol " "(LOPRESSOR) 25 MG Tab Take 1 Tab by mouth 1 time daily as needed. Take 1 pill as needed for rapid heart rate 60 Tab 11   • CINNAMON PO Take 1 Tab by mouth every day.     • ibuprofen (MOTRIN) 200 MG Tab Take 400-800 mg by mouth every 6 hours as needed for Mild Pain.       No facility-administered encounter medications on file as of 10/15/2018.      Review of Systems   Constitutional: Negative.  Negative for chills, fever and malaise/fatigue.   HENT: Negative.  Negative for sore throat.    Eyes: Negative.    Respiratory: Negative.  Negative for cough, hemoptysis, sputum production, shortness of breath, wheezing and stridor.    Cardiovascular: Negative.  Negative for chest pain, palpitations, orthopnea, claudication, leg swelling and PND.   Gastrointestinal: Negative.    Genitourinary: Negative.    Musculoskeletal: Negative.    Skin: Negative.    Neurological: Negative.  Negative for dizziness, loss of consciousness and weakness.   Endo/Heme/Allergies: Negative.  Does not bruise/bleed easily.   All other systems reviewed and are negative.       Objective:   /70 (BP Location: Left arm, Patient Position: Sitting, BP Cuff Size: Adult)   Pulse 73   Ht 1.651 m (5' 5\")   Wt 64.3 kg (141 lb 12.8 oz)   SpO2 100%   BMI 23.60 kg/m²      Physical Exam   Constitutional: She appears well-developed and well-nourished. No distress.   HENT:   Head: Normocephalic and atraumatic.   Right Ear: External ear normal.   Left Ear: External ear normal.   Nose: Nose normal.   Mouth/Throat: No oropharyngeal exudate.   Eyes: Pupils are equal, round, and reactive to light. Conjunctivae and EOM are normal. Right eye exhibits no discharge. Left eye exhibits no discharge. No scleral icterus.   Neck: Neck supple. No JVD present.   Cardiovascular: Normal rate, regular rhythm and intact distal pulses.  Exam reveals no gallop and no friction rub.    No murmur heard.  Pulmonary/Chest: Effort normal. No stridor. No respiratory distress. She has " no wheezes. She has no rales. She exhibits no tenderness.   Abdominal: Soft. She exhibits no distension. There is no guarding.   Musculoskeletal: Normal range of motion. She exhibits no edema, tenderness or deformity.   Neurological: She is alert. She has normal reflexes. She displays normal reflexes. No cranial nerve deficit. She exhibits normal muscle tone. Coordination normal.   Skin: Skin is warm and dry. No rash noted. She is not diaphoretic. No erythema. No pallor.   Psychiatric: She has a normal mood and affect. Her behavior is normal. Judgment and thought content normal.   Nursing note and vitals reviewed.      Assessment:     1. SVT (supraventricular tachycardia) (HCC)     2. Hypophosphatasia     3. AVNRT (AV nevaeh re-entry tachycardia) (HCC)     4. Anxiety         Medical Decision Making:  Today's Assessment / Status / Plan:     48-year-old female with benign palpitations and limited runs of SVT which looks more consistent with an atrial tachycardia.  At this point we will continue to monitor her.  I told her to take some magnesium pills for this.  We will see her back in 6 monoths.    Thank for you allowing me to take part in your patient's care, please call should you have any questions or would like to discuss this patient.      Ling Valladares M.D.  [address]  [facility] [address]  VIA Facsimile: [phone]

## 2018-10-15 NOTE — PROGRESS NOTES
"Chief Complaint   Patient presents with   • Congestive Heart Failure     supraventricular tachycardia       Subjective:   Yudith Coburn is a 48 y.o. female who presents today as a follow-up for her supraventricular tachycardia.  She did wear a event recorder that showed 2 different events both of which appeared consistent with either an SVT or atrial tachycardia.  Since then she has had only a couple of events.  She will also be concerned because she be sitting at home watching TV and her heart rate will jump from the 60s to the 80s.  She knows this because she feels like her heart going fast and she wears of fitness monitor.  Otherwise no chest pain palpitations or shortness of breath.    History reviewed. No pertinent past medical history.  Past Surgical History:   Procedure Laterality Date   • APPENDECTOMY     • GYN SURGERY         • OTHER ORTHOPEDIC SURGERY      right hip     History reviewed. No pertinent family history.  Social History     Social History   • Marital status:      Spouse name: N/A   • Number of children: N/A   • Years of education: N/A     Occupational History   • Not on file.     Social History Main Topics   • Smoking status: Never Smoker   • Smokeless tobacco: Never Used   • Alcohol use Yes      Comment: occ   • Drug use: No   • Sexual activity: Not on file     Other Topics Concern   • Not on file     Social History Narrative   • No narrative on file     Allergies   Allergen Reactions   • Hydrocodone Itching     \"itching\"      Outpatient Encounter Prescriptions as of 10/15/2018   Medication Sig Dispense Refill   • Calcium Carb-Cholecalciferol (CALCIUM 500 + D3 PO) Take 1 Tab by mouth every day.     • metoprolol (LOPRESSOR) 25 MG Tab Take 1 Tab by mouth 1 time daily as needed. Take 1 pill as needed for rapid heart rate 60 Tab 11   • CINNAMON PO Take 1 Tab by mouth every day.     • ibuprofen (MOTRIN) 200 MG Tab Take 400-800 mg by mouth every 6 hours as needed for Mild Pain.   " "    No facility-administered encounter medications on file as of 10/15/2018.      Review of Systems   Constitutional: Negative.  Negative for chills, fever and malaise/fatigue.   HENT: Negative.  Negative for sore throat.    Eyes: Negative.    Respiratory: Negative.  Negative for cough, hemoptysis, sputum production, shortness of breath, wheezing and stridor.    Cardiovascular: Negative.  Negative for chest pain, palpitations, orthopnea, claudication, leg swelling and PND.   Gastrointestinal: Negative.    Genitourinary: Negative.    Musculoskeletal: Negative.    Skin: Negative.    Neurological: Negative.  Negative for dizziness, loss of consciousness and weakness.   Endo/Heme/Allergies: Negative.  Does not bruise/bleed easily.   All other systems reviewed and are negative.       Objective:   /70 (BP Location: Left arm, Patient Position: Sitting, BP Cuff Size: Adult)   Pulse 73   Ht 1.651 m (5' 5\")   Wt 64.3 kg (141 lb 12.8 oz)   SpO2 100%   BMI 23.60 kg/m²     Physical Exam   Constitutional: She appears well-developed and well-nourished. No distress.   HENT:   Head: Normocephalic and atraumatic.   Right Ear: External ear normal.   Left Ear: External ear normal.   Nose: Nose normal.   Mouth/Throat: No oropharyngeal exudate.   Eyes: Pupils are equal, round, and reactive to light. Conjunctivae and EOM are normal. Right eye exhibits no discharge. Left eye exhibits no discharge. No scleral icterus.   Neck: Neck supple. No JVD present.   Cardiovascular: Normal rate, regular rhythm and intact distal pulses.  Exam reveals no gallop and no friction rub.    No murmur heard.  Pulmonary/Chest: Effort normal. No stridor. No respiratory distress. She has no wheezes. She has no rales. She exhibits no tenderness.   Abdominal: Soft. She exhibits no distension. There is no guarding.   Musculoskeletal: Normal range of motion. She exhibits no edema, tenderness or deformity.   Neurological: She is alert. She has normal " reflexes. She displays normal reflexes. No cranial nerve deficit. She exhibits normal muscle tone. Coordination normal.   Skin: Skin is warm and dry. No rash noted. She is not diaphoretic. No erythema. No pallor.   Psychiatric: She has a normal mood and affect. Her behavior is normal. Judgment and thought content normal.   Nursing note and vitals reviewed.      Assessment:     1. SVT (supraventricular tachycardia) ([facility])     2. Hypophosphatasia     3. AVNRT (AV nevaeh re-entry tachycardia) ([facility])     4. Anxiety         Medical Decision Making:  Today's Assessment / Status / Plan:     48-year-old female with benign palpitations and limited runs of SVT which looks more consistent with an atrial tachycardia.  At this point we will continue to monitor her.  I told her to take some magnesium pills for this.  We will see her back in 6 monoths.    Thank for you allowing me to take part in your patient's care, please call should you have any questions or would like to discuss this patient.

## 2018-10-16 ENCOUNTER — HOSPITAL ENCOUNTER (OUTPATIENT)
Facility: MEDICAL CENTER | Age: 48
End: 2018-10-16
Payer: COMMERCIAL

## 2018-10-16 LAB
BDY FAT % MEASURED: 28.2 %
BP DIAS: 80 MMHG
BP SYS: 110 MMHG
DIABETES HTDIA: NO
EVENT NAME HTEVT: NORMAL
HYPERTENSION HTHYP: NO
SCREENING LOC CITY HTCIT: NORMAL
SCREENING LOC STATE HTSTA: NORMAL
SCREENING LOCATION HTLOC: NORMAL
SUBSCRIBER ID HTSID: NORMAL

## 2018-10-17 LAB
CHOLEST SERPL-MCNC: 185 MG/DL (ref 100–199)
FASTING STATUS PATIENT QL REPORTED: NORMAL
GLUCOSE SERPL-MCNC: 100 MG/DL (ref 65–99)
HDLC SERPL-MCNC: 66 MG/DL
LDLC SERPL CALC-MCNC: 101 MG/DL
TRIGL SERPL-MCNC: 88 MG/DL (ref 0–149)

## 2019-01-31 ENCOUNTER — APPOINTMENT (RX ONLY)
Dept: URBAN - METROPOLITAN AREA CLINIC 35 | Facility: CLINIC | Age: 49
Setting detail: DERMATOLOGY
End: 2019-01-31

## 2019-01-31 DIAGNOSIS — Z41.9 ENCOUNTER FOR PROCEDURE FOR PURPOSES OTHER THAN REMEDYING HEALTH STATE, UNSPECIFIED: ICD-10-CM

## 2019-01-31 PROCEDURE — ? ADDITIONAL NOTES

## 2019-01-31 PROCEDURE — ? BOTOX

## 2019-01-31 NOTE — PROCEDURE: BOTOX
Periorbital Skin Units: 0
Additional Area 5 Location: perioral
Detail Level: Detailed
Additional Area 4 Location: Bunny lines
Additional Area 6 Location: platysma
Reconstitution Date (Optional): 1/31/2019
Lot #: [id number]
Price (Use Numbers Only, No Special Characters Or $): 300
Additional Area 2 Location: Crows Feet
Consent: Verbal and written informed consent were obtained to include the following risks: pain, swelling, bruising, eyelid or eyebrow droop, and lack of visible improvement of wrinkles in the areas treated.  The skin was cleansed with alcohol. Injections were administered with a 32g needle into the following areas:
Dilution (U/0.1 Cc): 5
Additional Area 1 Units: 25
Expiration Date (Month Year): 01/2021
Additional Area 1 Location: [place]
Additional Area 3 Location: [place]
Post-Care Instructions: Patient instructed to not lie down for 4 hours after injections and limit physical activity for 24 hours.

## 2019-04-04 ENCOUNTER — OFFICE VISIT (OUTPATIENT)
Dept: CARDIOLOGY | Facility: MEDICAL CENTER | Age: 49
End: 2019-04-04
Payer: COMMERCIAL

## 2019-04-04 VITALS
OXYGEN SATURATION: 100 % | SYSTOLIC BLOOD PRESSURE: 118 MMHG | DIASTOLIC BLOOD PRESSURE: 70 MMHG | BODY MASS INDEX: 24.03 KG/M2 | WEIGHT: 144.2 LBS | HEIGHT: 65 IN | HEART RATE: 72 BPM

## 2019-04-04 DIAGNOSIS — I47.10 SVT (SUPRAVENTRICULAR TACHYCARDIA): ICD-10-CM

## 2019-04-04 DIAGNOSIS — E83.39 HYPOPHOSPHATASIA: ICD-10-CM

## 2019-04-04 DIAGNOSIS — I47.19 AVNRT (AV NODAL RE-ENTRY TACHYCARDIA): ICD-10-CM

## 2019-04-04 DIAGNOSIS — F41.9 ANXIETY: ICD-10-CM

## 2019-04-04 PROCEDURE — 99214 OFFICE O/P EST MOD 30 MIN: CPT | Performed by: INTERNAL MEDICINE

## 2019-04-04 RX ORDER — MULTIVITAMIN WITH IRON
250 TABLET ORAL 2 TIMES DAILY
COMMUNITY

## 2019-04-04 ASSESSMENT — ENCOUNTER SYMPTOMS
SPUTUM PRODUCTION: 0
CONSTITUTIONAL NEGATIVE: 1
CLAUDICATION: 0
SHORTNESS OF BREATH: 0
LOSS OF CONSCIOUSNESS: 0
CHILLS: 0
SORE THROAT: 0
FEVER: 0
RESPIRATORY NEGATIVE: 1
STRIDOR: 0
DIZZINESS: 0
COUGH: 0
PND: 0
CARDIOVASCULAR NEGATIVE: 1
HEMOPTYSIS: 0
EYES NEGATIVE: 1
NEUROLOGICAL NEGATIVE: 1
GASTROINTESTINAL NEGATIVE: 1
BRUISES/BLEEDS EASILY: 0
MUSCULOSKELETAL NEGATIVE: 1
WEAKNESS: 0
PALPITATIONS: 0
WHEEZING: 0
ORTHOPNEA: 0

## 2019-04-04 NOTE — PROGRESS NOTES
"Chief Complaint   Patient presents with   • Supraventricular Tachycardia (SVT)       Subjective:   Yudith Coburn is a 49 y.o. female who presents today as a follow-up for her palpitations and AVNRT status post ablation.  She continues to have occasional palpitations.  She continues to take the magnesium pills for this.  She took the metoprolol the night about a month ago for the first time.  Otherwise she is been having no sustained tachycardias.    History reviewed. No pertinent past medical history.  Past Surgical History:   Procedure Laterality Date   • APPENDECTOMY     • GYN SURGERY         • OTHER ORTHOPEDIC SURGERY      right hip     History reviewed. No pertinent family history.  Social History     Social History   • Marital status:      Spouse name: N/A   • Number of children: N/A   • Years of education: N/A     Occupational History   • Not on file.     Social History Main Topics   • Smoking status: Never Smoker   • Smokeless tobacco: Never Used   • Alcohol use Yes      Comment: occ   • Drug use: No   • Sexual activity: Not on file     Other Topics Concern   • Not on file     Social History Narrative   • No narrative on file     Allergies   Allergen Reactions   • Hydrocodone Itching     \"itching\"      Outpatient Encounter Prescriptions as of 2019   Medication Sig Dispense Refill   • Magnesium 250 MG Tab Take  by mouth.     • metoprolol (LOPRESSOR) 25 MG Tab Take 1 Tab by mouth 1 time daily as needed. Take 1 pill as needed for rapid heart rate 60 Tab 11   • Calcium Carb-Cholecalciferol (CALCIUM 500 + D3 PO) Take 1 Tab by mouth every day.     • [DISCONTINUED] metoprolol (LOPRESSOR) 25 MG Tab Take 1 Tab by mouth 1 time daily as needed. Take 1 pill as needed for rapid heart rate 60 Tab 11   • CINNAMON PO Take 1 Tab by mouth every day.     • ibuprofen (MOTRIN) 200 MG Tab Take 400-800 mg by mouth every 6 hours as needed for Mild Pain.       No facility-administered encounter medications on " "file as of 4/4/2019.      Review of Systems   Constitutional: Negative.  Negative for chills, fever and malaise/fatigue.   HENT: Negative.  Negative for sore throat.    Eyes: Negative.    Respiratory: Negative.  Negative for cough, hemoptysis, sputum production, shortness of breath, wheezing and stridor.    Cardiovascular: Negative.  Negative for chest pain, palpitations, orthopnea, claudication, leg swelling and PND.   Gastrointestinal: Negative.    Genitourinary: Negative.    Musculoskeletal: Negative.    Skin: Negative.    Neurological: Negative.  Negative for dizziness, loss of consciousness and weakness.   Endo/Heme/Allergies: Negative.  Does not bruise/bleed easily.   All other systems reviewed and are negative.       Objective:   /70 (BP Location: Left arm, Patient Position: Sitting, BP Cuff Size: Adult)   Pulse 72   Ht 1.651 m (5' 5\")   Wt 65.4 kg (144 lb 3.2 oz)   SpO2 100%   BMI 24.00 kg/m²     Physical Exam   Constitutional: She appears well-developed and well-nourished. No distress.   HENT:   Head: Normocephalic and atraumatic.   Right Ear: External ear normal.   Left Ear: External ear normal.   Nose: Nose normal.   Mouth/Throat: No oropharyngeal exudate.   Eyes: Pupils are equal, round, and reactive to light. Conjunctivae and EOM are normal. Right eye exhibits no discharge. Left eye exhibits no discharge. No scleral icterus.   Neck: Neck supple. No JVD present.   Cardiovascular: Normal rate, regular rhythm and intact distal pulses.  Exam reveals no gallop and no friction rub.    No murmur heard.  Pulmonary/Chest: Effort normal. No stridor. No respiratory distress. She has no wheezes. She has no rales. She exhibits no tenderness.   Abdominal: Soft. She exhibits no distension. There is no guarding.   Musculoskeletal: Normal range of motion. She exhibits no edema, tenderness or deformity.   Neurological: She is alert. She has normal reflexes. She displays normal reflexes. No cranial nerve " deficit. She exhibits normal muscle tone. Coordination normal.   Skin: Skin is warm and dry. No rash noted. She is not diaphoretic. No erythema. No pallor.   Psychiatric: She has a normal mood and affect. Her behavior is normal. Judgment and thought content normal.   Nursing note and vitals reviewed.      Assessment:     1. AVNRT (AV nevaeh re-entry tachycardia) (HCC)  metoprolol (LOPRESSOR) 25 MG Tab   2. SVT (supraventricular tachycardia) (HCC)  metoprolol (LOPRESSOR) 25 MG Tab   3. Hypophosphatasia     4. Anxiety         Medical Decision Making:  Today's Assessment / Status / Plan:     49-year-old female with symptomatic runs of nonsustained palpitations.  We went over her strips with her and showed her that these are in fact benign.  She can continue to take the metoprolol as needed for these.  I refilled this for 1 year.  Otherwise she will monitor herself for recurrence.  We will see her in 1 year.    Thank for you allowing me to take part in your patient's care, please call should you have any questions or would like to discuss this patient.

## 2019-04-04 NOTE — LETTER
"     [facility]   [address]  [place] [address]  Phone: [phone]  Fax: [phone]              Yudith Coburn  1970    Encounter Date: 2019    Branden Cordon M.D.          PROGRESS NOTE:  Chief Complaint   Patient presents with   • Supraventricular Tachycardia (SVT)       Subjective:   Yudith Coburn is a 49 y.o. female who presents today as a follow-up for her palpitations and AVNRT status post ablation.  She continues to have occasional palpitations.  She continues to take the magnesium pills for this.  She took the metoprolol the night about a month ago for the first time.  Otherwise she is been having no sustained tachycardias.    History reviewed. No pertinent past medical history.  Past Surgical History:   Procedure Laterality Date   • APPENDECTOMY     • GYN SURGERY         • OTHER ORTHOPEDIC SURGERY      right hip     History reviewed. No pertinent family history.  Social History     Social History   • Marital status:      Spouse name: N/A   • Number of children: N/A   • Years of education: N/A     Occupational History   • Not on file.     Social History Main Topics   • Smoking status: Never Smoker   • Smokeless tobacco: Never Used   • Alcohol use Yes      Comment: occ   • Drug use: No   • Sexual activity: Not on file     Other Topics Concern   • Not on file     Social History Narrative   • No narrative on file     Allergies   Allergen Reactions   • Hydrocodone Itching     \"itching\"      Outpatient Encounter Prescriptions as of 2019   Medication Sig Dispense Refill   • Magnesium 250 MG Tab Take  by mouth.     • metoprolol (LOPRESSOR) 25 MG Tab Take 1 Tab by mouth 1 time daily as needed. Take 1 pill as needed for rapid heart rate 60 Tab 11   • Calcium Carb-Cholecalciferol (CALCIUM 500 + D3 PO) Take 1 Tab by mouth every day.     • [DISCONTINUED] metoprolol (LOPRESSOR) 25 MG Tab Take 1 Tab by mouth 1 time daily as " "needed. Take 1 pill as needed for rapid heart rate 60 Tab 11   • CINNAMON PO Take 1 Tab by mouth every day.     • ibuprofen (MOTRIN) 200 MG Tab Take 400-800 mg by mouth every 6 hours as needed for Mild Pain.       No facility-administered encounter medications on file as of 4/4/2019.      Review of Systems   Constitutional: Negative.  Negative for chills, fever and malaise/fatigue.   HENT: Negative.  Negative for sore throat.    Eyes: Negative.    Respiratory: Negative.  Negative for cough, hemoptysis, sputum production, shortness of breath, wheezing and stridor.    Cardiovascular: Negative.  Negative for chest pain, palpitations, orthopnea, claudication, leg swelling and PND.   Gastrointestinal: Negative.    Genitourinary: Negative.    Musculoskeletal: Negative.    Skin: Negative.    Neurological: Negative.  Negative for dizziness, loss of consciousness and weakness.   Endo/Heme/Allergies: Negative.  Does not bruise/bleed easily.   All other systems reviewed and are negative.       Objective:   /70 (BP Location: Left arm, Patient Position: Sitting, BP Cuff Size: Adult)   Pulse 72   Ht 1.651 m (5' 5\")   Wt 65.4 kg (144 lb 3.2 oz)   SpO2 100%   BMI 24.00 kg/m²      Physical Exam   Constitutional: She appears well-developed and well-nourished. No distress.   HENT:   Head: Normocephalic and atraumatic.   Right Ear: External ear normal.   Left Ear: External ear normal.   Nose: Nose normal.   Mouth/Throat: No oropharyngeal exudate.   Eyes: Pupils are equal, round, and reactive to light. Conjunctivae and EOM are normal. Right eye exhibits no discharge. Left eye exhibits no discharge. No scleral icterus.   Neck: Neck supple. No JVD present.   Cardiovascular: Normal rate, regular rhythm and intact distal pulses.  Exam reveals no gallop and no friction rub.    No murmur heard.  Pulmonary/Chest: Effort normal. No stridor. No respiratory distress. She has no wheezes. She has no rales. She exhibits no tenderness.   "   Abdominal: Soft. She exhibits no distension. There is no guarding.   Musculoskeletal: Normal range of motion. She exhibits no edema, tenderness or deformity.   Neurological: She is alert. She has normal reflexes. She displays normal reflexes. No cranial nerve deficit. She exhibits normal muscle tone. Coordination normal.   Skin: Skin is warm and dry. No rash noted. She is not diaphoretic. No erythema. No pallor.   Psychiatric: She has a normal mood and affect. Her behavior is normal. Judgment and thought content normal.   Nursing note and vitals reviewed.      Assessment:     1. AVNRT (AV nevaeh re-entry tachycardia) (HCC)  metoprolol (LOPRESSOR) 25 MG Tab   2. SVT (supraventricular tachycardia) (HCC)  metoprolol (LOPRESSOR) 25 MG Tab   3. Hypophosphatasia     4. Anxiety         Medical Decision Making:  Today's Assessment / Status / Plan:     49-year-old female with symptomatic runs of nonsustained palpitations.  We went over her strips with her and showed her that these are in fact benign.  She can continue to take the metoprolol as needed for these.  I refilled this for 1 year.  Otherwise she will monitor herself for recurrence.  We will see her in 1 year.    Thank for you allowing me to take part in your patient's care, please call should you have any questions or would like to discuss this patient.      Ling Valladares M.D.  [address]  [place] [address]  VIA Facsimile: [phone]

## 2019-06-05 ENCOUNTER — APPOINTMENT (RX ONLY)
Dept: URBAN - METROPOLITAN AREA CLINIC 35 | Facility: CLINIC | Age: 49
Setting detail: DERMATOLOGY
End: 2019-06-05

## 2019-06-05 DIAGNOSIS — Z41.9 ENCOUNTER FOR PROCEDURE FOR PURPOSES OTHER THAN REMEDYING HEALTH STATE, UNSPECIFIED: ICD-10-CM

## 2019-06-05 PROCEDURE — ? ADDITIONAL NOTES

## 2019-06-05 PROCEDURE — ? BOTOX

## 2019-06-05 NOTE — PROCEDURE: BOTOX
Reconstitution Date (Optional): 6/5/2019
Post-Care Instructions: Patient instructed to not lie down for 4 hours after injections and limit physical activity for 24 hours.
Lateral Platysmal Bands Units: 0
Lot #: [id number]
Detail Level: Detailed
Additional Area 2 Location: Crows Feet
Price (Use Numbers Only, No Special Characters Or $): 300
Additional Area 3 Location: Frontalis
Additional Area 5 Location: perioral
Dilution (U/0.1 Cc): 5
Additional Area 4 Location: Bunny lines
Additional Area 6 Location: platysma
Additional Area 1 Units: 25
Additional Area 1 Location: Glabella
Consent: Verbal and written informed consent were obtained to include the following risks: pain, swelling, bruising, eyelid or eyebrow droop, and lack of visible improvement of wrinkles in the areas treated.  The skin was cleansed with alcohol. Injections were administered with a 32g needle into the following areas:
Expiration Date (Month Year): 01/2021

## 2019-08-07 ENCOUNTER — HOSPITAL ENCOUNTER (OUTPATIENT)
Dept: RADIOLOGY | Facility: MEDICAL CENTER | Age: 49
End: 2019-08-07
Attending: OBSTETRICS & GYNECOLOGY
Payer: COMMERCIAL

## 2019-08-07 DIAGNOSIS — Z12.39 SCREENING BREAST EXAMINATION: ICD-10-CM

## 2019-08-07 PROCEDURE — 77063 BREAST TOMOSYNTHESIS BI: CPT

## 2019-09-18 ENCOUNTER — APPOINTMENT (RX ONLY)
Dept: URBAN - METROPOLITAN AREA CLINIC 35 | Facility: CLINIC | Age: 49
Setting detail: DERMATOLOGY
End: 2019-09-18

## 2019-09-18 DIAGNOSIS — D22 MELANOCYTIC NEVI: ICD-10-CM

## 2019-09-18 DIAGNOSIS — L82.1 OTHER SEBORRHEIC KERATOSIS: ICD-10-CM

## 2019-09-18 DIAGNOSIS — Z71.89 OTHER SPECIFIED COUNSELING: ICD-10-CM

## 2019-09-18 DIAGNOSIS — L81.8 OTHER SPECIFIED DISORDERS OF PIGMENTATION: ICD-10-CM

## 2019-09-18 DIAGNOSIS — L81.4 OTHER MELANIN HYPERPIGMENTATION: ICD-10-CM

## 2019-09-18 DIAGNOSIS — L57.0 ACTINIC KERATOSIS: ICD-10-CM

## 2019-09-18 PROBLEM — D23.71 OTHER BENIGN NEOPLASM OF SKIN OF RIGHT LOWER LIMB, INCLUDING HIP: Status: ACTIVE | Noted: 2019-09-18

## 2019-09-18 PROBLEM — D22.61 MELANOCYTIC NEVI OF RIGHT UPPER LIMB, INCLUDING SHOULDER: Status: ACTIVE | Noted: 2019-09-18

## 2019-09-18 PROBLEM — D22.5 MELANOCYTIC NEVI OF TRUNK: Status: ACTIVE | Noted: 2019-09-18

## 2019-09-18 PROBLEM — D22.71 MELANOCYTIC NEVI OF RIGHT LOWER LIMB, INCLUDING HIP: Status: ACTIVE | Noted: 2019-09-18

## 2019-09-18 PROCEDURE — 17000 DESTRUCT PREMALG LESION: CPT

## 2019-09-18 PROCEDURE — 17003 DESTRUCT PREMALG LES 2-14: CPT

## 2019-09-18 PROCEDURE — 99214 OFFICE O/P EST MOD 30 MIN: CPT | Mod: 25

## 2019-09-18 PROCEDURE — ? COUNSELING

## 2019-09-18 PROCEDURE — ? BENIGN DESTRUCTION

## 2019-09-18 PROCEDURE — ? LIQUID NITROGEN

## 2019-09-18 PROCEDURE — ? OBSERVATION AND MEASURE

## 2019-09-18 ASSESSMENT — LOCATION SIMPLE DESCRIPTION DERM
LOCATION SIMPLE: RIGHT ELBOW
LOCATION SIMPLE: LEFT LIP
LOCATION SIMPLE: NOSE
LOCATION SIMPLE: ABDOMEN
LOCATION SIMPLE: RIGHT FOOT
LOCATION SIMPLE: CHEST
LOCATION SIMPLE: RIGHT PLANTAR SURFACE
LOCATION SIMPLE: RIGHT FOREHEAD
LOCATION SIMPLE: RIGHT PRETIBIAL REGION
LOCATION SIMPLE: RIGHT FOREARM
LOCATION SIMPLE: LEFT UPPER BACK
LOCATION SIMPLE: LEFT SUBMANDIBULAR AREA

## 2019-09-18 ASSESSMENT — LOCATION DETAILED DESCRIPTION DERM
LOCATION DETAILED: RIGHT INFERIOR LATERAL FOREHEAD
LOCATION DETAILED: LEFT UPPER CUTANEOUS LIP
LOCATION DETAILED: NASAL SUPRATIP
LOCATION DETAILED: NASAL ROOT
LOCATION DETAILED: EPIGASTRIC SKIN
LOCATION DETAILED: LEFT SUBMANDIBULAR AREA
LOCATION DETAILED: RIGHT PROXIMAL PRETIBIAL REGION
LOCATION DETAILED: LEFT INFERIOR UPPER BACK
LOCATION DETAILED: RIGHT MEDIAL ELBOW
LOCATION DETAILED: RIGHT MEDIAL SUPERIOR CHEST
LOCATION DETAILED: RIGHT DISTAL RADIAL DORSAL FOREARM
LOCATION DETAILED: RIGHT LATERAL HEEL
LOCATION DETAILED: RIGHT INSTEP
LOCATION DETAILED: LEFT MID-UPPER BACK

## 2019-09-18 ASSESSMENT — LOCATION ZONE DERM
LOCATION ZONE: ARM
LOCATION ZONE: FACE
LOCATION ZONE: FEET
LOCATION ZONE: LEG
LOCATION ZONE: LIP
LOCATION ZONE: NOSE
LOCATION ZONE: TRUNK

## 2019-09-18 NOTE — PROCEDURE: BENIGN DESTRUCTION
Medical Necessity Clause: This procedure was medically necessary because the lesions that were treated were:
Bill Insurance (You Assume Risk Of Denial Or Audit By Selecting Yes): No
Consent: The patient's consent was obtained including but not limited to risks of crusting, scabbing, blistering, scarring, darker or lighter pigmentary change, recurrence, incomplete removal and infection.
Detail Level: Detailed
Post-Care Instructions: I reviewed with the patient in detail post-care instructions. Patient is to wear sunprotection, and avoid picking at any of the treated lesions. Pt may apply Vaseline to crusted or scabbing areas.
Medical Necessity Information: It is in your best interest to select a reason for this procedure from the list below. All of these items fulfill various CMS LCD requirements except the new and changing color options.
Anesthesia Volume In Cc: 0

## 2019-09-18 NOTE — PROCEDURE: LIQUID NITROGEN
Number Of Freeze-Thaw Cycles: 2 freeze-thaw cycles
Post-Care Instructions: I reviewed with the patient in detail post-care instructions. Patient is to wear sunprotection, and avoid picking at any of the treated lesions. Pt may apply Vaseline to crusted or scabbing areas.
Detail Level: Detailed
Consent: The patient's consent was obtained including but not limited to risks of crusting, scabbing, blistering, scarring, darker or lighter pigmentary change, recurrence, incomplete removal and infection.
Duration Of Freeze Thaw-Cycle (Seconds): 2
Render Post-Care Instructions In Note?: no

## 2019-09-18 NOTE — PROCEDURE: OBSERVATION
Detail Level: Detailed
Size Of Lesion: 3 mm
Morphology Per Location (Optional): brown papule
Size Of Lesion: 4 mm
Morphology Per Location (Optional): tan macule
Size Of Lesion: 7 mm x 9 mm
Morphology Per Location (Optional): uniform tan macule

## 2019-09-19 ENCOUNTER — HOSPITAL ENCOUNTER (OUTPATIENT)
Dept: RADIOLOGY | Facility: MEDICAL CENTER | Age: 49
End: 2019-09-19
Attending: NURSE PRACTITIONER
Payer: COMMERCIAL

## 2019-09-19 DIAGNOSIS — N94.89 PELVIC CONGESTION SYNDROME: ICD-10-CM

## 2019-09-19 PROCEDURE — 76830 TRANSVAGINAL US NON-OB: CPT

## 2019-10-16 ENCOUNTER — APPOINTMENT (RX ONLY)
Dept: URBAN - METROPOLITAN AREA CLINIC 35 | Facility: CLINIC | Age: 49
Setting detail: DERMATOLOGY
End: 2019-10-16

## 2019-10-16 DIAGNOSIS — Z41.9 ENCOUNTER FOR PROCEDURE FOR PURPOSES OTHER THAN REMEDYING HEALTH STATE, UNSPECIFIED: ICD-10-CM

## 2019-10-16 PROCEDURE — ? ADDITIONAL NOTES

## 2019-10-16 PROCEDURE — ? BOTOX

## 2019-10-16 NOTE — PROCEDURE: BOTOX
Additional Area 6 Units: 0
Additional Area 2 Location: Crows Feet
Price (Use Numbers Only, No Special Characters Or $): 300
Lot #: [id number]
Detail Level: Detailed
Expiration Date (Month Year): 04/22
Additional Area 5 Location: perioral
Additional Area 1 Units: 25
Dilution (U/0.1 Cc): 5
Additional Area 1 Location: Glabella
Additional Area 4 Location: Bunny lines
Reconstitution Date (Optional): 10/16/19
Post-Care Instructions: Patient instructed to not lie down for 4 hours after injections and limit physical activity for 24 hours.
Consent: Verbal and written informed consent were obtained to include the following risks: pain, swelling, bruising, eyelid or eyebrow droop, and lack of visible improvement of wrinkles in the areas treated.  The skin was cleansed with alcohol. Injections were administered with a 32g needle into the following areas:
Additional Area 3 Location: Frontalis
Additional Area 6 Location: platysma

## 2020-02-19 ENCOUNTER — APPOINTMENT (RX ONLY)
Dept: URBAN - METROPOLITAN AREA CLINIC 35 | Facility: CLINIC | Age: 50
Setting detail: DERMATOLOGY
End: 2020-02-19

## 2020-02-19 DIAGNOSIS — Z41.9 ENCOUNTER FOR PROCEDURE FOR PURPOSES OTHER THAN REMEDYING HEALTH STATE, UNSPECIFIED: ICD-10-CM

## 2020-02-19 PROCEDURE — ? BOTOX

## 2020-02-19 PROCEDURE — ? ADDITIONAL NOTES

## 2020-02-19 NOTE — PROCEDURE: BOTOX
Additional Area 3 Location: Frontalis
Mentalis Units: 0
Expiration Date (Month Year): 05/22
Dilution (U/0.1 Cc): 5
Reconstitution Date (Optional): 2/19/20
Additional Area 2 Location: Crows Feet
Additional Area 1 Units: 25
Additional Area 1 Location: Glabella
Detail Level: Detailed
Post-Care Instructions: Patient instructed to not lie down for 4 hours after injections and limit physical activity for 24 hours.
Lot #: [id number]
Additional Area 5 Location: perioral
Additional Area 4 Location: Bunny lines
Additional Area 6 Location: platysma
Price (Use Numbers Only, No Special Characters Or $): 300
Consent: Verbal and written informed consent were obtained to include the following risks: pain, swelling, bruising, eyelid or eyebrow droop, and lack of visible improvement of wrinkles in the areas treated.  The skin was cleansed with alcohol. Injections were administered with a 32g needle into the following areas:

## 2020-04-20 ENCOUNTER — TELEMEDICINE (OUTPATIENT)
Dept: CARDIOLOGY | Facility: MEDICAL CENTER | Age: 50
End: 2020-04-20

## 2020-04-20 VITALS — WEIGHT: 140 LBS | HEIGHT: 65 IN | BODY MASS INDEX: 23.32 KG/M2

## 2020-04-20 DIAGNOSIS — I47.10 SVT (SUPRAVENTRICULAR TACHYCARDIA) (HCC): ICD-10-CM

## 2020-04-20 DIAGNOSIS — I47.19 AVNRT (AV NODAL RE-ENTRY TACHYCARDIA) (HCC): ICD-10-CM

## 2020-04-20 PROCEDURE — 99213 OFFICE O/P EST LOW 20 MIN: CPT | Mod: 95 | Performed by: INTERNAL MEDICINE

## 2020-04-20 RX ORDER — ESOMEPRAZOLE MAGNESIUM 20 MG/1
GRANULE, DELAYED RELEASE ORAL
COMMUNITY
End: 2021-04-20

## 2020-04-20 ASSESSMENT — ENCOUNTER SYMPTOMS
RESPIRATORY NEGATIVE: 1
GASTROINTESTINAL NEGATIVE: 1
WEAKNESS: 0
CARDIOVASCULAR NEGATIVE: 1
NEUROLOGICAL NEGATIVE: 1
CHILLS: 0
CONSTITUTIONAL NEGATIVE: 1
SHORTNESS OF BREATH: 0
CLAUDICATION: 0
LOSS OF CONSCIOUSNESS: 0
BRUISES/BLEEDS EASILY: 0
SPUTUM PRODUCTION: 0
PALPITATIONS: 0
STRIDOR: 0
PND: 0
FEVER: 0
WHEEZING: 0
SORE THROAT: 0
MUSCULOSKELETAL NEGATIVE: 1
ORTHOPNEA: 0
HEMOPTYSIS: 0
EYES NEGATIVE: 1
DIZZINESS: 0
COUGH: 0

## 2020-04-20 NOTE — PROGRESS NOTES
Chief Complaint   Patient presents with   • Supraventricular Tachycardia (SVT)       Subjective:   Yudith Coburn is a 50 y.o. female who presents today as a follow-up for her palpitations and SVT.  In the last year she is had to take the Toprol once.  She is having no palpitations outside of that.  She is been having no chest pain.  She is working from home.  She is had no other changes to her medical history.    History reviewed. No pertinent past medical history.  Past Surgical History:   Procedure Laterality Date   • APPENDECTOMY     • GYN SURGERY         • OTHER ORTHOPEDIC SURGERY      right hip     History reviewed. No pertinent family history.  Social History     Socioeconomic History   • Marital status:      Spouse name: Not on file   • Number of children: Not on file   • Years of education: Not on file   • Highest education level: Not on file   Occupational History   • Not on file   Social Needs   • Financial resource strain: Not on file   • Food insecurity     Worry: Not on file     Inability: Not on file   • Transportation needs     Medical: Not on file     Non-medical: Not on file   Tobacco Use   • Smoking status: Never Smoker   • Smokeless tobacco: Never Used   Substance and Sexual Activity   • Alcohol use: Yes     Comment: occ   • Drug use: No   • Sexual activity: Not on file   Lifestyle   • Physical activity     Days per week: Not on file     Minutes per session: Not on file   • Stress: Not on file   Relationships   • Social connections     Talks on phone: Not on file     Gets together: Not on file     Attends [employer] service: Not on file     Active member of club or organization: Not on file     Attends meetings of clubs or organizations: Not on file     Relationship status: Not on file   • Intimate partner violence     Fear of current or ex partner: Not on file     Emotionally abused: Not on file     Physically abused: Not on file     Forced sexual activity: Not on file   Other  "Topics Concern   • Not on file   Social History Narrative   • Not on file     Allergies   Allergen Reactions   • Hydrocodone Itching     \"itching\"      Outpatient Encounter Medications as of 4/20/2020   Medication Sig Dispense Refill   • Esomeprazole Magnesium (NEXIUM) 20 MG Pack Take  by mouth.     • metoprolol (LOPRESSOR) 25 MG Tab Take 1 Tab by mouth 1 time daily as needed. Take 1 pill as needed for rapid heart rate 60 Tab 11   • Magnesium 250 MG Tab Take  by mouth.     • Calcium Carb-Cholecalciferol (CALCIUM 500 + D3 PO) Take 1 Tab by mouth every day.     • [DISCONTINUED] metoprolol (LOPRESSOR) 25 MG Tab Take 1 Tab by mouth 1 time daily as needed. Take 1 pill as needed for rapid heart rate 60 Tab 11   • CINNAMON PO Take 1 Tab by mouth every day.     • ibuprofen (MOTRIN) 200 MG Tab Take 400-800 mg by mouth every 6 hours as needed for Mild Pain.       No facility-administered encounter medications on file as of 4/20/2020.      Review of Systems   Constitutional: Negative.  Negative for chills, fever and malaise/fatigue.   HENT: Negative.  Negative for sore throat.    Eyes: Negative.    Respiratory: Negative.  Negative for cough, hemoptysis, sputum production, shortness of breath, wheezing and stridor.    Cardiovascular: Negative.  Negative for chest pain, palpitations, orthopnea, claudication, leg swelling and PND.   Gastrointestinal: Negative.    Genitourinary: Negative.    Musculoskeletal: Negative.    Skin: Negative.    Neurological: Negative.  Negative for dizziness, loss of consciousness and weakness.   Endo/Heme/Allergies: Negative.  Does not bruise/bleed easily.   All other systems reviewed and are negative.       Objective:   Ht 1.651 m (5' 5\")   Wt 63.5 kg (140 lb)   BMI 23.30 kg/m²     Physical Exam   Constitutional: She appears well-developed and well-nourished. No distress.   HENT:   Head: Normocephalic and atraumatic.   Right Ear: External ear normal.   Left Ear: External ear normal.   Nose: Nose " normal.   Mouth/Throat: No oropharyngeal exudate.   Eyes: Pupils are equal, round, and reactive to light. Conjunctivae and EOM are normal. Right eye exhibits no discharge. Left eye exhibits no discharge. No scleral icterus.   Neck: Neck supple. No JVD present.   Cardiovascular: Normal rate, regular rhythm and intact distal pulses. Exam reveals no gallop and no friction rub.   No murmur heard.  Pulmonary/Chest: Effort normal. No stridor. No respiratory distress. She has no wheezes. She has no rales. She exhibits no tenderness.   Abdominal: Soft. She exhibits no distension. There is no guarding.   Musculoskeletal: Normal range of motion.         General: No tenderness, deformity or edema.   Neurological: She is alert. She has normal reflexes. She displays normal reflexes. No cranial nerve deficit. She exhibits normal muscle tone. Coordination normal.   Skin: Skin is warm and dry. No rash noted. She is not diaphoretic. No erythema. No pallor.   Psychiatric: She has a normal mood and affect. Her behavior is normal. Judgment and thought content normal.   Nursing note and vitals reviewed.      Assessment:     1. AVNRT (AV nevaeh re-entry tachycardia) (HCC)  metoprolol (LOPRESSOR) 25 MG Tab   2. SVT (supraventricular tachycardia) (HCC)  metoprolol (LOPRESSOR) 25 MG Tab       Medical Decision Making:  Today's Assessment / Status / Plan:     50-year-old female with SVT status post ablation with occasional palpitations.  She will stay on the magnesium.  I refilled her metoprolol for 1 year.  We will see her back in 1 year.    This encounter was conducted via Zoom .   Verbal consent was obtained. Patient's identity was verified.

## 2020-06-24 ENCOUNTER — APPOINTMENT (RX ONLY)
Dept: URBAN - METROPOLITAN AREA CLINIC 35 | Facility: CLINIC | Age: 50
Setting detail: DERMATOLOGY
End: 2020-06-24

## 2020-06-24 DIAGNOSIS — Z41.9 ENCOUNTER FOR PROCEDURE FOR PURPOSES OTHER THAN REMEDYING HEALTH STATE, UNSPECIFIED: ICD-10-CM

## 2020-06-24 PROCEDURE — ? BOTOX

## 2020-06-24 PROCEDURE — ? ADDITIONAL NOTES

## 2020-06-24 NOTE — PROCEDURE: BOTOX
Additional Area 3 Location: Frontalis
Mentalis Units: 0
Expiration Date (Month Year): 09/22
Dilution (U/0.1 Cc): 5
Reconstitution Date (Optional): 6/24/20
Additional Area 2 Location: Crows Feet
Additional Area 1 Units: 25
Additional Area 1 Location: Glabella
Detail Level: Detailed
Post-Care Instructions: Patient instructed to not lie down for 4 hours after injections and limit physical activity for 24 hours.
Lot #: [id number]
Additional Area 5 Location: perioral
Additional Area 4 Location: Bunny lines
Additional Area 6 Location: platysma
Price (Use Numbers Only, No Special Characters Or $): 300
Consent: Verbal and written informed consent were obtained to include the following risks: pain, swelling, bruising, eyelid or eyebrow droop, and lack of visible improvement of wrinkles in the areas treated.  The skin was cleansed with alcohol. Injections were administered with a 32g needle into the following areas:

## 2020-09-21 ENCOUNTER — APPOINTMENT (RX ONLY)
Dept: URBAN - METROPOLITAN AREA CLINIC 35 | Facility: CLINIC | Age: 50
Setting detail: DERMATOLOGY
End: 2020-09-21

## 2020-09-21 DIAGNOSIS — D22 MELANOCYTIC NEVI: ICD-10-CM

## 2020-09-21 DIAGNOSIS — L81.4 OTHER MELANIN HYPERPIGMENTATION: ICD-10-CM

## 2020-09-21 DIAGNOSIS — L73.8 OTHER SPECIFIED FOLLICULAR DISORDERS: ICD-10-CM

## 2020-09-21 DIAGNOSIS — L57.0 ACTINIC KERATOSIS: ICD-10-CM

## 2020-09-21 DIAGNOSIS — L82.0 INFLAMED SEBORRHEIC KERATOSIS: ICD-10-CM

## 2020-09-21 DIAGNOSIS — Z71.89 OTHER SPECIFIED COUNSELING: ICD-10-CM

## 2020-09-21 PROBLEM — D22.5 MELANOCYTIC NEVI OF TRUNK: Status: ACTIVE | Noted: 2020-09-21

## 2020-09-21 PROBLEM — D22.61 MELANOCYTIC NEVI OF RIGHT UPPER LIMB, INCLUDING SHOULDER: Status: ACTIVE | Noted: 2020-09-21

## 2020-09-21 PROBLEM — D22.71 MELANOCYTIC NEVI OF RIGHT LOWER LIMB, INCLUDING HIP: Status: ACTIVE | Noted: 2020-09-21

## 2020-09-21 PROBLEM — D23.71 OTHER BENIGN NEOPLASM OF SKIN OF RIGHT LOWER LIMB, INCLUDING HIP: Status: ACTIVE | Noted: 2020-09-21

## 2020-09-21 PROCEDURE — ? OBSERVATION AND MEASURE

## 2020-09-21 PROCEDURE — ? COUNSELING

## 2020-09-21 PROCEDURE — ? LIQUID NITROGEN

## 2020-09-21 PROCEDURE — 17000 DESTRUCT PREMALG LESION: CPT | Mod: 59

## 2020-09-21 PROCEDURE — ? BENIGN DESTRUCTION COSMETIC

## 2020-09-21 PROCEDURE — ? TREATMENT REGIMEN

## 2020-09-21 PROCEDURE — 17110 DESTRUCTION B9 LES UP TO 14: CPT | Mod: 52

## 2020-09-21 PROCEDURE — ? PRESCRIPTION

## 2020-09-21 PROCEDURE — 99214 OFFICE O/P EST MOD 30 MIN: CPT | Mod: 25

## 2020-09-21 RX ORDER — FLUOROURACIL 5 MG/G
DAILY CREAM TOPICAL
Qty: 1 | Refills: 1 | Status: ERX | COMMUNITY
Start: 2020-09-21

## 2020-09-21 RX ORDER — CALCIPOTRIENE 0.05 MG/G
OINTMENT TOPICAL
Qty: 1 | Refills: 1 | Status: ERX | COMMUNITY
Start: 2020-09-21

## 2020-09-21 RX ADMIN — CALCIPOTRIENE 1: 0.05 OINTMENT TOPICAL at 00:00

## 2020-09-21 RX ADMIN — FLUOROURACIL DAILY: 5 CREAM TOPICAL at 00:00

## 2020-09-21 ASSESSMENT — LOCATION ZONE DERM
LOCATION ZONE: FEET
LOCATION ZONE: LEG
LOCATION ZONE: ARM
LOCATION ZONE: FACE
LOCATION ZONE: TRUNK
LOCATION ZONE: LIP

## 2020-09-21 ASSESSMENT — LOCATION DETAILED DESCRIPTION DERM
LOCATION DETAILED: LEFT UPPER CUTANEOUS LIP
LOCATION DETAILED: RIGHT INSTEP
LOCATION DETAILED: RIGHT MEDIAL SUPERIOR CHEST
LOCATION DETAILED: RIGHT MEDIAL ELBOW
LOCATION DETAILED: LEFT SUBMANDIBULAR AREA
LOCATION DETAILED: LEFT CENTRAL TEMPLE
LOCATION DETAILED: RIGHT INFERIOR MEDIAL MALAR CHEEK
LOCATION DETAILED: RIGHT SUPERIOR FOREHEAD
LOCATION DETAILED: LEFT MID-UPPER BACK
LOCATION DETAILED: RIGHT PROXIMAL PRETIBIAL REGION
LOCATION DETAILED: EPIGASTRIC SKIN

## 2020-09-21 ASSESSMENT — LOCATION SIMPLE DESCRIPTION DERM
LOCATION SIMPLE: RIGHT FOREHEAD
LOCATION SIMPLE: LEFT UPPER BACK
LOCATION SIMPLE: CHEST
LOCATION SIMPLE: LEFT LIP
LOCATION SIMPLE: RIGHT CHEEK
LOCATION SIMPLE: ABDOMEN
LOCATION SIMPLE: LEFT SUBMANDIBULAR AREA
LOCATION SIMPLE: LEFT TEMPLE
LOCATION SIMPLE: RIGHT PRETIBIAL REGION
LOCATION SIMPLE: RIGHT PLANTAR SURFACE
LOCATION SIMPLE: RIGHT ELBOW

## 2020-09-21 NOTE — PROCEDURE: BENIGN DESTRUCTION COSMETIC
Price (Use Numbers Only, No Special Characters Or $): 0.00
Post-Care Instructions: I reviewed with the patient in detail post-care instructions. Patient is to wear sunprotection, and avoid picking at any of the treated lesions. Pt may apply Vaseline to crusted or scabbing areas.
Anesthesia Volume In Cc: 0.5
Consent: The patient's consent was obtained including but not limited to risks of crusting, scabbing, blistering, scarring, darker or lighter pigmentary change, recurrence, incomplete removal and infection.
Detail Level: Detailed

## 2020-09-21 NOTE — PROCEDURE: TREATMENT REGIMEN
Detail Level: Simple
Initiate Treatment: -fluorouracil 5 % topical cream. Apply thin layer twice daily x 4 days, then Calcipotriene on top of Fluorouracil, treat one area at a time on face and neck\\n-calcipotriene 0.005 % topical ointment. Apply on affected skin over fluorouracil topical cream twice  daily x 4 days, treat one area at a time

## 2020-10-28 ENCOUNTER — APPOINTMENT (RX ONLY)
Dept: URBAN - METROPOLITAN AREA CLINIC 35 | Facility: CLINIC | Age: 50
Setting detail: DERMATOLOGY
End: 2020-10-28

## 2020-10-28 DIAGNOSIS — Z41.9 ENCOUNTER FOR PROCEDURE FOR PURPOSES OTHER THAN REMEDYING HEALTH STATE, UNSPECIFIED: ICD-10-CM

## 2020-10-28 PROCEDURE — ? BOTOX

## 2020-10-28 PROCEDURE — ? ADDITIONAL NOTES

## 2020-10-28 NOTE — PROCEDURE: BOTOX
Additional Area 3 Location: Frontalis
Mentalis Units: 0
Expiration Date (Month Year): 12/22
Dilution (U/0.1 Cc): 1.1
Reconstitution Date (Optional): 10/28/20
Additional Area 2 Location: Crows Feet
Additional Area 1 Units: 25
Additional Area 1 Location: Glabella
Detail Level: Detailed
Post-Care Instructions: Patient instructed to not lie down for 4 hours after injections and limit physical activity for 24 hours.
Lot #: [id number]
Additional Area 5 Location: perioral
Additional Area 4 Location: Bunny lines
Additional Area 6 Location: platysma
Price (Use Numbers Only, No Special Characters Or $): 300
Consent: Verbal and written informed consent were obtained to include the following risks: pain, swelling, bruising, eyelid or eyebrow droop, and lack of visible improvement of wrinkles in the areas treated.  The skin was cleansed with alcohol. Injections were administered with a 32g needle into the following areas:

## 2021-02-24 ENCOUNTER — APPOINTMENT (RX ONLY)
Dept: URBAN - METROPOLITAN AREA CLINIC 35 | Facility: CLINIC | Age: 51
Setting detail: DERMATOLOGY
End: 2021-02-24

## 2021-02-24 DIAGNOSIS — Z41.9 ENCOUNTER FOR PROCEDURE FOR PURPOSES OTHER THAN REMEDYING HEALTH STATE, UNSPECIFIED: ICD-10-CM

## 2021-02-24 PROCEDURE — ? ADDITIONAL NOTES

## 2021-02-24 PROCEDURE — ? BOTOX

## 2021-02-24 NOTE — PROCEDURE: BOTOX
Additional Area 3 Location: Frontalis
Mentalis Units: 0
Expiration Date (Month Year): 8/23
Dilution (U/0.1 Cc): 1.1
Reconstitution Date (Optional): 2/24/21
Additional Area 2 Location: Crows Feet
Additional Area 1 Units: 25
Additional Area 1 Location: Glabella
Detail Level: Detailed
Post-Care Instructions: Patient instructed to not lie down for 4 hours after injections and limit physical activity for 24 hours.
Lot #: [id number]
Additional Area 5 Location: perioral
Additional Area 4 Location: Bunny lines
Additional Area 6 Location: platysma
Price (Use Numbers Only, No Special Characters Or $): 300
Consent: Verbal and written informed consent were obtained to include the following risks: pain, swelling, bruising, eyelid or eyebrow droop, and lack of visible improvement of wrinkles in the areas treated.  The skin was cleansed with alcohol. Injections were administered with a 32g needle into the following areas:

## 2021-04-13 NOTE — PROCEDURE: LIQUID NITROGEN
General:
Render Post-Care Instructions In Note?: no
Consent: The patient's consent was obtained including but not limited to risks of crusting, scabbing, blistering, scarring, darker or lighter pigmentary change, recurrence, incomplete removal and infection.
Number Of Freeze-Thaw Cycles: 2 freeze-thaw cycles
Post-Care Instructions: I reviewed with the patient in detail post-care instructions. Patient is to wear sunprotection, and avoid picking at any of the treated lesions. Pt may apply Vaseline to crusted or scabbing areas.
Detail Level: Detailed
Duration Of Freeze Thaw-Cycle (Seconds): 2
Include Z78.9 (Other Specified Conditions Influencing Health Status) As An Associated Diagnosis?: Yes
Medical Necessity Clause: This procedure was medically necessary because the lesions that were treated were:
Medical Necessity Information: It is in your best interest to select a reason for this procedure from the list below. All of these items fulfill various CMS LCD requirements except the new and changing color options.
Duration Of Freeze Thaw-Cycle (Seconds): 3

## 2021-04-20 ENCOUNTER — OFFICE VISIT (OUTPATIENT)
Dept: CARDIOLOGY | Facility: MEDICAL CENTER | Age: 51
End: 2021-04-20
Payer: COMMERCIAL

## 2021-04-20 VITALS
HEART RATE: 76 BPM | BODY MASS INDEX: 23.46 KG/M2 | DIASTOLIC BLOOD PRESSURE: 72 MMHG | RESPIRATION RATE: 16 BRPM | OXYGEN SATURATION: 99 % | WEIGHT: 140.8 LBS | HEIGHT: 65 IN | SYSTOLIC BLOOD PRESSURE: 112 MMHG

## 2021-04-20 DIAGNOSIS — I47.10 SVT (SUPRAVENTRICULAR TACHYCARDIA) (HCC): ICD-10-CM

## 2021-04-20 DIAGNOSIS — I47.19 AVNRT (AV NODAL RE-ENTRY TACHYCARDIA) (HCC): ICD-10-CM

## 2021-04-20 PROCEDURE — 99214 OFFICE O/P EST MOD 30 MIN: CPT | Performed by: INTERNAL MEDICINE

## 2021-04-20 RX ORDER — TIZANIDINE 4 MG/1
TABLET ORAL
COMMUNITY
Start: 2021-03-30 | End: 2021-04-20

## 2021-04-20 RX ORDER — NORETHINDRONE ACETATE/ETHINYL ESTRADIOL AND FERROUS FUMARATE 1MG-20(24)
KIT ORAL
COMMUNITY
Start: 2020-11-02 | End: 2021-04-20

## 2021-04-20 RX ORDER — METHYLPREDNISOLONE 4 MG/1
TABLET ORAL
COMMUNITY
Start: 2021-03-30 | End: 2021-04-20

## 2021-04-20 RX ORDER — ZOLPIDEM TARTRATE 10 MG/1
10 TABLET ORAL NIGHTLY PRN
COMMUNITY
Start: 2021-02-02

## 2021-04-20 RX ORDER — ESTRADIOL 1 MG/1
1 TABLET ORAL DAILY
COMMUNITY
Start: 2021-02-17 | End: 2022-05-20

## 2021-04-20 ASSESSMENT — ENCOUNTER SYMPTOMS
COUGH: 0
SORE THROAT: 0
CARDIOVASCULAR NEGATIVE: 1
PND: 0
SHORTNESS OF BREATH: 0
WEAKNESS: 0
NEUROLOGICAL NEGATIVE: 1
ORTHOPNEA: 0
CLAUDICATION: 0
STRIDOR: 0
EYES NEGATIVE: 1
HEMOPTYSIS: 0
LOSS OF CONSCIOUSNESS: 0
WHEEZING: 0
BRUISES/BLEEDS EASILY: 0
GASTROINTESTINAL NEGATIVE: 1
PALPITATIONS: 0
MUSCULOSKELETAL NEGATIVE: 1
RESPIRATORY NEGATIVE: 1
DIZZINESS: 0
FEVER: 0
CHILLS: 0
SPUTUM PRODUCTION: 0
CONSTITUTIONAL NEGATIVE: 1

## 2021-04-20 NOTE — PROGRESS NOTES
Chief Complaint   Patient presents with   • Supraventricular Tachycardia (SVT)       Subjective:   Yudith Coburn is a 50 y.o. female who presents today as a follow-up for her palpitations and SVT.  Since his last change occasionally has palpitations.  She has had this 3 times last year.  She is suffering from a lot of symptoms menopause.  Otherwise she is doing well.      No past medical history on file.  Past Surgical History:   Procedure Laterality Date   • APPENDECTOMY     • GYN SURGERY         • OTHER ORTHOPEDIC SURGERY      right hip     No family history on file.  Social History     Socioeconomic History   • Marital status:      Spouse name: Not on file   • Number of children: Not on file   • Years of education: Not on file   • Highest education level: Not on file   Occupational History   • Not on file   Tobacco Use   • Smoking status: Never Smoker   • Smokeless tobacco: Never Used   Substance and Sexual Activity   • Alcohol use: Yes     Comment: occ   • Drug use: No   • Sexual activity: Not on file   Other Topics Concern   • Not on file   Social History Narrative   • Not on file     Social Determinants of Health     Financial Resource Strain:    • Difficulty of Paying Living Expenses:    Food Insecurity:    • Worried About Running Out of Food in the Last Year:    • Ran Out of Food in the Last Year:    Transportation Needs:    • Lack of Transportation (Medical):    • Lack of Transportation (Non-Medical):    Physical Activity:    • Days of Exercise per Week:    • Minutes of Exercise per Session:    Stress:    • Feeling of Stress :    Social Connections:    • Frequency of Communication with Friends and Family:    • Frequency of Social Gatherings with Friends and Family:    • Attends Jain Services:    • Active Member of Clubs or Organizations:    • Attends Club or Organization Meetings:    • Marital Status:    Intimate Partner Violence:    • Fear of Current or Ex-Partner:    • Emotionally  "Abused:    • Physically Abused:    • Sexually Abused:      Allergies   Allergen Reactions   • Hydrocodone Itching     \"itching\"      Outpatient Encounter Medications as of 4/20/2021   Medication Sig Dispense Refill   • estradiol (ESTRACE) 1 MG Tab Take 1 mg by mouth every day.     • progesterone (PROMETRIUM) 100 MG Cap Take 100 mg by mouth every day.     • zolpidem (AMBIEN) 10 MG Tab Take 10 mg by mouth at bedtime as needed.     • esomeprazole (NEXIUM) 20 MG capsule Take 20 mg by mouth every day.     • metoprolol tartrate (LOPRESSOR) 25 MG Tab Take 1 tablet by mouth 1 time a day as needed. Take 1 pill as needed for rapid heart rate 60 tablet 11   • Magnesium 250 MG Tab Take 250 mg by mouth 2 times a day.     • Calcium Carb-Cholecalciferol (CALCIUM 500 + D3 PO) Take 1 Tab by mouth every day.     • ibuprofen (MOTRIN) 200 MG Tab Take 400-800 mg by mouth every 6 hours as needed for Mild Pain.     • [DISCONTINUED] methylPREDNISolone (MEDROL DOSEPAK) 4 MG Tablet Therapy Pack      • [DISCONTINUED] Norethin Ace-Eth Estrad-FE (LISA 24 FE) 1-20 MG-MCG(24) Tab LISA 24 FE 1-20 MG-MCG(24) TABS     • [DISCONTINUED] Norethin Ace-Eth Estrad-FE (LISA 24 FE) 1-20 MG-MCG(24) Tab LISA 24 FE 1-20 MG-MCG(24) TABS     • [DISCONTINUED] tizanidine (ZANAFLEX) 4 MG Tab      • [DISCONTINUED] Esomeprazole Magnesium (NEXIUM) 20 MG Pack Take  by mouth.     • [DISCONTINUED] metoprolol (LOPRESSOR) 25 MG Tab Take 1 Tab by mouth 1 time daily as needed. Take 1 pill as needed for rapid heart rate 60 Tab 11   • [DISCONTINUED] CINNAMON PO Take 1 Tab by mouth every day.       No facility-administered encounter medications on file as of 4/20/2021.     Review of Systems   Constitutional: Negative.  Negative for chills, fever and malaise/fatigue.   HENT: Negative.  Negative for sore throat.    Eyes: Negative.    Respiratory: Negative.  Negative for cough, hemoptysis, sputum production, shortness of breath, wheezing and stridor.    Cardiovascular: " "Negative.  Negative for chest pain, palpitations, orthopnea, claudication, leg swelling and PND.   Gastrointestinal: Negative.    Genitourinary: Negative.    Musculoskeletal: Negative.    Skin: Negative.    Neurological: Negative.  Negative for dizziness, loss of consciousness and weakness.   Endo/Heme/Allergies: Negative.  Does not bruise/bleed easily.   All other systems reviewed and are negative.       Objective:   /72 (BP Location: Left arm, Patient Position: Sitting, BP Cuff Size: Adult)   Pulse 76   Resp 16   Ht 1.651 m (5' 5\")   Wt 63.9 kg (140 lb 12.8 oz)   SpO2 99%   BMI 23.43 kg/m²     Physical Exam   Constitutional: She appears well-developed and well-nourished. No distress.   HENT:   Head: Normocephalic and atraumatic.   Right Ear: External ear normal.   Left Ear: External ear normal.   Nose: Nose normal.   Mouth/Throat: No oropharyngeal exudate.   Eyes: Pupils are equal, round, and reactive to light. Conjunctivae and EOM are normal. Right eye exhibits no discharge. Left eye exhibits no discharge. No scleral icterus.   Neck: No JVD present.   Cardiovascular: Normal rate, regular rhythm and intact distal pulses. Exam reveals no gallop and no friction rub.   No murmur heard.  Pulmonary/Chest: Effort normal. No stridor. No respiratory distress. She has no wheezes. She has no rales. She exhibits no tenderness.   Abdominal: Soft. She exhibits no distension. There is no guarding.   Musculoskeletal:         General: No tenderness, deformity or edema. Normal range of motion.      Cervical back: Neck supple.   Neurological: She is alert. She has normal reflexes. She displays normal reflexes. No cranial nerve deficit. She exhibits normal muscle tone. Coordination normal.   Skin: Skin is warm and dry. No rash noted. She is not diaphoretic. No erythema. No pallor.   Psychiatric: She has a normal mood and affect. Her behavior is normal. Judgment and thought content normal.   Nursing note and vitals " reviewed.    Zio patch: Showing nonsustained SVT.  Assessment:     1. AVNRT (AV nevaeh re-entry tachycardia) (HCC)  metoprolol tartrate (LOPRESSOR) 25 MG Tab   2. SVT (supraventricular tachycardia) (HCC)  metoprolol tartrate (LOPRESSOR) 25 MG Tab       Medical Decision Making:  Today's Assessment / Status / Plan:     50-year-old female with SVT status post ablation with occasional palpitations.  She will stay on the magnesium.  I refilled her metoprolol.  She is otherwise doing well.  We will see her back in 1 year.  I offered her flecainide but she declined and will let us know if she would like to change her mind on this.

## 2021-07-21 ENCOUNTER — APPOINTMENT (RX ONLY)
Dept: URBAN - METROPOLITAN AREA CLINIC 35 | Facility: CLINIC | Age: 51
Setting detail: DERMATOLOGY
End: 2021-07-21

## 2021-07-21 DIAGNOSIS — Z41.9 ENCOUNTER FOR PROCEDURE FOR PURPOSES OTHER THAN REMEDYING HEALTH STATE, UNSPECIFIED: ICD-10-CM

## 2021-07-21 PROCEDURE — ? ADDITIONAL NOTES

## 2021-07-21 PROCEDURE — ? BOTOX

## 2021-07-21 NOTE — PROCEDURE: BOTOX
Additional Area 3 Location: Frontalis
Mentalis Units: 0
Expiration Date (Month Year): 11/23
Dilution (U/0.1 Cc): 1.1
Reconstitution Date (Optional): 7/21/21
Additional Area 2 Location: Crows Feet
Additional Area 1 Units: 25
Additional Area 1 Location: Glabella
Detail Level: Detailed
Post-Care Instructions: Patient instructed to not lie down for 4 hours after injections and limit physical activity for 24 hours.
Lot #: [id number]
Additional Area 5 Location: perioral
Additional Area 4 Location: Bunny lines
Additional Area 6 Location: platysma
Price (Use Numbers Only, No Special Characters Or $): 300
Consent: Verbal and written informed consent were obtained to include the following risks: pain, swelling, bruising, eyelid or eyebrow droop, and lack of visible improvement of wrinkles in the areas treated.  The skin was cleansed with alcohol. Injections were administered with a 32g needle into the following areas:

## 2021-07-21 NOTE — PROCEDURE: ADDITIONAL NOTES
Detail Level: Simple
Additional Notes: Discussed with patient [place] State Law will need 50 units of Botox.

## 2021-09-21 ENCOUNTER — APPOINTMENT (RX ONLY)
Dept: URBAN - METROPOLITAN AREA CLINIC 35 | Facility: CLINIC | Age: 51
Setting detail: DERMATOLOGY
End: 2021-09-21

## 2021-09-21 DIAGNOSIS — L57.0 ACTINIC KERATOSIS: ICD-10-CM

## 2021-09-21 DIAGNOSIS — L81.4 OTHER MELANIN HYPERPIGMENTATION: ICD-10-CM

## 2021-09-21 DIAGNOSIS — D22 MELANOCYTIC NEVI: ICD-10-CM

## 2021-09-21 DIAGNOSIS — Z71.89 OTHER SPECIFIED COUNSELING: ICD-10-CM

## 2021-09-21 DIAGNOSIS — L82.1 OTHER SEBORRHEIC KERATOSIS: ICD-10-CM

## 2021-09-21 DIAGNOSIS — D18.0 HEMANGIOMA: ICD-10-CM

## 2021-09-21 PROBLEM — D18.01 HEMANGIOMA OF SKIN AND SUBCUTANEOUS TISSUE: Status: ACTIVE | Noted: 2021-09-21

## 2021-09-21 PROBLEM — D22.5 MELANOCYTIC NEVI OF TRUNK: Status: ACTIVE | Noted: 2021-09-21

## 2021-09-21 PROBLEM — D23.71 OTHER BENIGN NEOPLASM OF SKIN OF RIGHT LOWER LIMB, INCLUDING HIP: Status: ACTIVE | Noted: 2021-09-21

## 2021-09-21 PROBLEM — D22.61 MELANOCYTIC NEVI OF RIGHT UPPER LIMB, INCLUDING SHOULDER: Status: ACTIVE | Noted: 2021-09-21

## 2021-09-21 PROCEDURE — ? SUNSCREEN RECOMMENDATIONS

## 2021-09-21 PROCEDURE — 99213 OFFICE O/P EST LOW 20 MIN: CPT | Mod: 25

## 2021-09-21 PROCEDURE — ? OBSERVATION AND MEASURE

## 2021-09-21 PROCEDURE — ? LIQUID NITROGEN

## 2021-09-21 PROCEDURE — 17003 DESTRUCT PREMALG LES 2-14: CPT

## 2021-09-21 PROCEDURE — 17000 DESTRUCT PREMALG LESION: CPT

## 2021-09-21 PROCEDURE — ? COUNSELING

## 2021-09-21 PROCEDURE — ? LIQUID NITROGEN (COSMETIC)

## 2021-09-21 ASSESSMENT — LOCATION SIMPLE DESCRIPTION DERM
LOCATION SIMPLE: RIGHT HAND
LOCATION SIMPLE: LEFT UPPER BACK
LOCATION SIMPLE: LEFT EAR
LOCATION SIMPLE: LEFT SUBMANDIBULAR AREA
LOCATION SIMPLE: RIGHT ELBOW
LOCATION SIMPLE: CHEST
LOCATION SIMPLE: RIGHT PRETIBIAL REGION
LOCATION SIMPLE: RIGHT SHOULDER
LOCATION SIMPLE: RIGHT FOREHEAD
LOCATION SIMPLE: LEFT MIDDLE FINGER

## 2021-09-21 ASSESSMENT — LOCATION DETAILED DESCRIPTION DERM
LOCATION DETAILED: LEFT PROXIMAL RADIAL DORSAL MIDDLE FINGER
LOCATION DETAILED: RIGHT DORSAL INDEX FINGER METACARPOPHALANGEAL JOINT
LOCATION DETAILED: LEFT MID-UPPER BACK
LOCATION DETAILED: LEFT SUBMANDIBULAR AREA
LOCATION DETAILED: RIGHT MEDIAL SUPERIOR CHEST
LOCATION DETAILED: RIGHT SUPERIOR FOREHEAD
LOCATION DETAILED: RIGHT PROXIMAL PRETIBIAL REGION
LOCATION DETAILED: RIGHT MEDIAL ELBOW
LOCATION DETAILED: LEFT INFERIOR HELIX
LOCATION DETAILED: RIGHT POSTERIOR SHOULDER

## 2021-09-21 ASSESSMENT — LOCATION ZONE DERM
LOCATION ZONE: EAR
LOCATION ZONE: TRUNK
LOCATION ZONE: FACE
LOCATION ZONE: HAND
LOCATION ZONE: ARM
LOCATION ZONE: FINGER
LOCATION ZONE: LEG

## 2021-09-21 NOTE — PROCEDURE: LIQUID NITROGEN (COSMETIC)
Price (Use Numbers Only, No Special Characters Or $): 0.00
Render Post-Care Instructions In Note?: no
Consent: The patient's consent was obtained including but not limited to risks of crusting, scabbing, blistering, scarring, darker or lighter pigmentary change, recurrence, incomplete removal and infection. The patient understands that the procedure is cosmetic in nature and is not covered by insurance.
Detail Level: Detailed
Billing Information: Bill by Static Price
Post-Care Instructions: I reviewed with the patient in detail post-care instructions. Patient is to wear sunprotection, and avoid picking at any of the treated lesions. Pt may apply Vaseline to crusted or scabbing areas.

## 2021-09-21 NOTE — PROCEDURE: LIQUID NITROGEN
Render Post-Care Instructions In Note?: no
Detail Level: Detailed
Show Applicator Variable?: Yes
Duration Of Freeze Thaw-Cycle (Seconds): 2
Post-Care Instructions: I reviewed with the patient in detail post-care instructions. Patient is to wear sunprotection, and avoid picking at any of the treated lesions. Pt may apply Vaseline to crusted or scabbing areas.
Number Of Freeze-Thaw Cycles: 2 freeze-thaw cycles
Consent: The patient's consent was obtained including but not limited to risks of crusting, scabbing, blistering, scarring, darker or lighter pigmentary change, recurrence, incomplete removal and infection.

## 2021-12-01 ENCOUNTER — APPOINTMENT (RX ONLY)
Dept: URBAN - METROPOLITAN AREA CLINIC 35 | Facility: CLINIC | Age: 51
Setting detail: DERMATOLOGY
End: 2021-12-01

## 2021-12-01 DIAGNOSIS — Z41.9 ENCOUNTER FOR PROCEDURE FOR PURPOSES OTHER THAN REMEDYING HEALTH STATE, UNSPECIFIED: ICD-10-CM

## 2021-12-01 PROCEDURE — ? ADDITIONAL NOTES

## 2021-12-01 PROCEDURE — ? BOTOX

## 2021-12-01 NOTE — PROCEDURE: BOTOX
Additional Area 3 Location: Frontalis
Mentalis Units: 0
Expiration Date (Month Year): 4/24
Dilution (U/0.1 Cc): 1.1
Reconstitution Date (Optional): 12/1/21
Additional Area 2 Location: Crows Feet
Additional Area 2 Units: 25
Additional Area 1 Location: Glabella
Detail Level: Detailed
Post-Care Instructions: Patient instructed to not lie down for 4 hours after injections and limit physical activity for 24 hours.
Lot #: [id number]
Additional Area 5 Location: perioral
Additional Area 4 Location: Bunny lines
Additional Area 6 Location: platysma
Price (Use Numbers Only, No Special Characters Or $): 473
Consent: Verbal and written informed consent were obtained to include the following risks: pain, swelling, bruising, eyelid or eyebrow droop, and lack of visible improvement of wrinkles in the areas treated.  The skin was cleansed with alcohol. Injections were administered with a 32g needle into the following areas:

## 2021-12-02 NOTE — PROCEDURE: ADDITIONAL NOTES
[FreeTextEntry1] : Pap done today.\par \par Self-breast exam reviewed.\par \par Advised while on Depo-Provera there is temporary bone loss, recommended to take calcium and multi vitamins. Recommended to check bone density due to patient being on the injection for 12 years. Prescription for DEXA studies were given. She declines other options for birth control.\par \par She will follow up annually and as needed.\par 
Detail Level: Simple

## 2022-05-20 ENCOUNTER — OFFICE VISIT (OUTPATIENT)
Dept: CARDIOLOGY | Facility: MEDICAL CENTER | Age: 52
End: 2022-05-20
Payer: COMMERCIAL

## 2022-05-20 VITALS
OXYGEN SATURATION: 99 % | WEIGHT: 137 LBS | HEART RATE: 71 BPM | SYSTOLIC BLOOD PRESSURE: 114 MMHG | HEIGHT: 65 IN | RESPIRATION RATE: 16 BRPM | BODY MASS INDEX: 22.82 KG/M2 | DIASTOLIC BLOOD PRESSURE: 72 MMHG

## 2022-05-20 DIAGNOSIS — F41.9 ANXIETY: ICD-10-CM

## 2022-05-20 DIAGNOSIS — I47.19 AVNRT (AV NODAL RE-ENTRY TACHYCARDIA) (HCC): ICD-10-CM

## 2022-05-20 DIAGNOSIS — I47.10 SVT (SUPRAVENTRICULAR TACHYCARDIA) (HCC): ICD-10-CM

## 2022-05-20 PROCEDURE — 99214 OFFICE O/P EST MOD 30 MIN: CPT | Performed by: INTERNAL MEDICINE

## 2022-05-20 RX ORDER — ESTRADIOL 0.1 MG/D
FILM, EXTENDED RELEASE TRANSDERMAL
COMMUNITY
Start: 2022-04-21

## 2022-05-20 ASSESSMENT — ENCOUNTER SYMPTOMS
RESPIRATORY NEGATIVE: 1
MUSCULOSKELETAL NEGATIVE: 1
LOSS OF CONSCIOUSNESS: 0
GASTROINTESTINAL NEGATIVE: 1
HEMOPTYSIS: 0
ORTHOPNEA: 0
SHORTNESS OF BREATH: 0
DIZZINESS: 0
EYES NEGATIVE: 1
NEUROLOGICAL NEGATIVE: 1
CHILLS: 0
PALPITATIONS: 0
CONSTITUTIONAL NEGATIVE: 1
STRIDOR: 0
WHEEZING: 0
SPUTUM PRODUCTION: 0
PND: 0
CARDIOVASCULAR NEGATIVE: 1
COUGH: 0
FEVER: 0
SORE THROAT: 0
CLAUDICATION: 0
WEAKNESS: 0
BRUISES/BLEEDS EASILY: 0

## 2022-05-20 NOTE — PROGRESS NOTES
"Chief Complaint   Patient presents with   • Supraventricular Tachycardia (SVT)     & AVNRT (AV nevaeh re-entry tachycardia) (HCC)       Subjective:   Yudith Coburn is a 50 y.o. female who presents today as a follow-up for her palpitations and SVT.      Since she was last seen she has had to take metoprolol proximately twice.  She feels like this works well for her palpitations.  Though they do last for a couple of hours.  She was offered flecainide before but declined.  She is otherwise been well.      History reviewed. No pertinent past medical history.  Past Surgical History:   Procedure Laterality Date   • APPENDECTOMY     • GYN SURGERY         • OTHER ORTHOPEDIC SURGERY      right hip     History reviewed. No pertinent family history.  Social History     Socioeconomic History   • Marital status:      Spouse name: Not on file   • Number of children: Not on file   • Years of education: Not on file   • Highest education level: Not on file   Occupational History   • Not on file   Tobacco Use   • Smoking status: Never Smoker   • Smokeless tobacco: Never Used   Vaping Use   • Vaping Use: Never used   Substance and Sexual Activity   • Alcohol use: Yes     Comment: occ   • Drug use: No   • Sexual activity: Not on file   Other Topics Concern   • Not on file   Social History Narrative   • Not on file     Social Determinants of Health     Financial Resource Strain: Not on file   Food Insecurity: Not on file   Transportation Needs: Not on file   Physical Activity: Not on file   Stress: Not on file   Social Connections: Not on file   Intimate Partner Violence: Not on file   Housing Stability: Not on file     Allergies   Allergen Reactions   • Hydrocodone Itching     \"itching\"      Outpatient Encounter Medications as of 2022   Medication Sig Dispense Refill   • estradiol (VIVELLE DOT) 0.1 MG/24HR PATCH BIWEEKLY      • metoprolol tartrate (LOPRESSOR) 25 MG Tab Take 1 Tablet by mouth 1 time a day as " "needed (for tachycardia). Take 1 pill as needed for rapid heart rate 60 Tablet 11   • progesterone (PROMETRIUM) 100 MG Cap Take 100 mg by mouth every day.     • zolpidem (AMBIEN) 10 MG Tab Take 10 mg by mouth at bedtime as needed.     • Magnesium 250 MG Tab Take 250 mg by mouth 2 times a day.     • Calcium Carb-Cholecalciferol (CALCIUM 500 + D3 PO) Take 1 Tab by mouth every day.     • [DISCONTINUED] estradiol (ESTRACE) 1 MG Tab Take 1 mg by mouth every day.     • [DISCONTINUED] esomeprazole (NEXIUM) 20 MG capsule Take 20 mg by mouth every day.     • [DISCONTINUED] metoprolol tartrate (LOPRESSOR) 25 MG Tab Take 1 tablet by mouth 1 time a day as needed. Take 1 pill as needed for rapid heart rate 60 tablet 11   • [DISCONTINUED] ibuprofen (MOTRIN) 200 MG Tab Take 400-800 mg by mouth every 6 hours as needed for Mild Pain.       No facility-administered encounter medications on file as of 5/20/2022.     Review of Systems   Constitutional: Negative.  Negative for chills, fever and malaise/fatigue.   HENT: Negative.  Negative for sore throat.    Eyes: Negative.    Respiratory: Negative.  Negative for cough, hemoptysis, sputum production, shortness of breath, wheezing and stridor.    Cardiovascular: Negative.  Negative for chest pain, palpitations, orthopnea, claudication, leg swelling and PND.   Gastrointestinal: Negative.    Genitourinary: Negative.    Musculoskeletal: Negative.    Skin: Negative.    Neurological: Negative.  Negative for dizziness, loss of consciousness and weakness.   Endo/Heme/Allergies: Negative.  Does not bruise/bleed easily.   All other systems reviewed and are negative.       Objective:   /72 (BP Location: Left arm, Patient Position: Sitting, BP Cuff Size: Adult)   Pulse 71   Resp 16   Ht 1.651 m (5' 5\")   Wt 62.1 kg (137 lb)   SpO2 99%   BMI 22.80 kg/m²     Physical Exam  Vitals and nursing note reviewed.   Constitutional:       General: She is not in acute distress.     Appearance: She " is well-developed. She is not diaphoretic.   HENT:      Head: Normocephalic and atraumatic.      Right Ear: External ear normal.      Left Ear: External ear normal.      Nose: Nose normal.      Mouth/Throat:      Pharynx: No oropharyngeal exudate.   Eyes:      General: No scleral icterus.        Right eye: No discharge.         Left eye: No discharge.      Conjunctiva/sclera: Conjunctivae normal.      Pupils: Pupils are equal, round, and reactive to light.   Neck:      Vascular: No JVD.   Cardiovascular:      Rate and Rhythm: Normal rate and regular rhythm.      Heart sounds: No murmur heard.    No friction rub. No gallop.   Pulmonary:      Effort: Pulmonary effort is normal. No respiratory distress.      Breath sounds: No stridor. No wheezing or rales.   Chest:      Chest wall: No tenderness.   Abdominal:      General: There is no distension.      Palpations: Abdomen is soft.      Tenderness: There is no guarding.   Musculoskeletal:         General: No tenderness or deformity. Normal range of motion.      Cervical back: Neck supple.   Skin:     General: Skin is warm and dry.      Coloration: Skin is not pale.      Findings: No erythema or rash.   Neurological:      Mental Status: She is alert.      Cranial Nerves: No cranial nerve deficit.      Motor: No abnormal muscle tone.      Coordination: Coordination normal.      Deep Tendon Reflexes: Reflexes are normal and symmetric. Reflexes normal.   Psychiatric:         Behavior: Behavior normal.         Thought Content: Thought content normal.         Judgment: Judgment normal.       Zio patch: Showing nonsustained SVT.  Assessment:     1. SVT (supraventricular tachycardia) (HCC)  metoprolol tartrate (LOPRESSOR) 25 MG Tab   2. Anxiety     3. AVNRT (AV nevaeh re-entry tachycardia) (HCC)  metoprolol tartrate (LOPRESSOR) 25 MG Tab       Medical Decision Making:  Today's Assessment / Status / Plan:     50-year-old female with SVT status post ablation with occasional  palpitations.  I refilled her metoprolol.  At this point she is satisfied with how this is working.  She is otherwise doing well I will see her back in 1 year.

## 2022-09-20 ENCOUNTER — APPOINTMENT (RX ONLY)
Dept: URBAN - METROPOLITAN AREA CLINIC 35 | Facility: CLINIC | Age: 52
Setting detail: DERMATOLOGY
End: 2022-09-20

## 2022-09-20 DIAGNOSIS — Z71.89 OTHER SPECIFIED COUNSELING: ICD-10-CM

## 2022-09-20 DIAGNOSIS — D18.0 HEMANGIOMA: ICD-10-CM

## 2022-09-20 DIAGNOSIS — L82.1 OTHER SEBORRHEIC KERATOSIS: ICD-10-CM

## 2022-09-20 DIAGNOSIS — D22 MELANOCYTIC NEVI: ICD-10-CM

## 2022-09-20 DIAGNOSIS — L57.0 ACTINIC KERATOSIS: ICD-10-CM

## 2022-09-20 DIAGNOSIS — L81.4 OTHER MELANIN HYPERPIGMENTATION: ICD-10-CM

## 2022-09-20 PROBLEM — D23.71 OTHER BENIGN NEOPLASM OF SKIN OF RIGHT LOWER LIMB, INCLUDING HIP: Status: ACTIVE | Noted: 2022-09-20

## 2022-09-20 PROBLEM — D18.01 HEMANGIOMA OF SKIN AND SUBCUTANEOUS TISSUE: Status: ACTIVE | Noted: 2022-09-20

## 2022-09-20 PROBLEM — D22.5 MELANOCYTIC NEVI OF TRUNK: Status: ACTIVE | Noted: 2022-09-20

## 2022-09-20 PROBLEM — D22.61 MELANOCYTIC NEVI OF RIGHT UPPER LIMB, INCLUDING SHOULDER: Status: ACTIVE | Noted: 2022-09-20

## 2022-09-20 PROCEDURE — ? SUNSCREEN RECOMMENDATIONS

## 2022-09-20 PROCEDURE — ? ADDITIONAL NOTES

## 2022-09-20 PROCEDURE — 17000 DESTRUCT PREMALG LESION: CPT

## 2022-09-20 PROCEDURE — ? PRESCRIPTION

## 2022-09-20 PROCEDURE — 17003 DESTRUCT PREMALG LES 2-14: CPT

## 2022-09-20 PROCEDURE — ? LIQUID NITROGEN

## 2022-09-20 PROCEDURE — 99213 OFFICE O/P EST LOW 20 MIN: CPT | Mod: 25

## 2022-09-20 PROCEDURE — ? OBSERVATION AND MEASURE

## 2022-09-20 PROCEDURE — ? COUNSELING

## 2022-09-20 RX ORDER — FLUOROURACIL 5 MG/G
1 CREAM TOPICAL TWICE A DAY
Qty: 40 | Refills: 3 | Status: ERX

## 2022-09-20 RX ORDER — CALCIPOTRIENE 0.05 MG/G
1 CREAM TOPICAL
Qty: 60 | Refills: 3 | Status: ERX | COMMUNITY
Start: 2022-09-20

## 2022-09-20 RX ADMIN — CALCIPOTRIENE 1: 0.05 CREAM TOPICAL at 00:00

## 2022-09-20 ASSESSMENT — LOCATION SIMPLE DESCRIPTION DERM
LOCATION SIMPLE: RIGHT PRETIBIAL REGION
LOCATION SIMPLE: LEFT UPPER BACK
LOCATION SIMPLE: CHEST
LOCATION SIMPLE: RIGHT ELBOW
LOCATION SIMPLE: RIGHT FOREARM
LOCATION SIMPLE: LEFT SUBMANDIBULAR AREA
LOCATION SIMPLE: NOSE
LOCATION SIMPLE: RIGHT SHOULDER

## 2022-09-20 ASSESSMENT — LOCATION ZONE DERM
LOCATION ZONE: ARM
LOCATION ZONE: NOSE
LOCATION ZONE: FACE
LOCATION ZONE: TRUNK
LOCATION ZONE: LEG

## 2022-09-20 ASSESSMENT — LOCATION DETAILED DESCRIPTION DERM
LOCATION DETAILED: RIGHT PROXIMAL PRETIBIAL REGION
LOCATION DETAILED: LEFT MID-UPPER BACK
LOCATION DETAILED: NASAL SUPRATIP
LOCATION DETAILED: RIGHT LATERAL ELBOW
LOCATION DETAILED: LEFT SUBMANDIBULAR AREA
LOCATION DETAILED: RIGHT MEDIAL SUPERIOR CHEST
LOCATION DETAILED: RIGHT PROXIMAL RADIAL DORSAL FOREARM
LOCATION DETAILED: RIGHT POSTERIOR SHOULDER
LOCATION DETAILED: RIGHT VENTRAL PROXIMAL FOREARM
LOCATION DETAILED: RIGHT VENTRAL LATERAL DISTAL FOREARM
LOCATION DETAILED: RIGHT MEDIAL ELBOW

## 2022-09-20 NOTE — PROCEDURE: LIQUID NITROGEN
Render Post-Care Instructions In Note?: no
Number Of Freeze-Thaw Cycles: 2 freeze-thaw cycles
Show Aperture Variable?: Yes
Consent: The patient's consent was obtained including but not limited to risks of crusting, scabbing, blistering, scarring, darker or lighter pigmentary change, recurrence, incomplete removal and infection.
Post-Care Instructions: I reviewed with the patient in detail post-care instructions. Patient is to wear sunprotection, and avoid picking at any of the treated lesions. Pt may apply Vaseline to crusted or scabbing areas.
Duration Of Freeze Thaw-Cycle (Seconds): 2
Detail Level: Detailed

## 2022-09-20 NOTE — PROCEDURE: ADDITIONAL NOTES
Detail Level: Simple
Additional Notes: Pt opted to treated tender spot on her nose with 5fu & Calcipotriene instead of ln2 today
Render Risk Assessment In Note?: yes

## 2023-04-17 ENCOUNTER — APPOINTMENT (OUTPATIENT)
Dept: URGENT CARE | Facility: PHYSICIAN GROUP | Age: 53
End: 2023-04-17

## 2023-05-01 ENCOUNTER — OFFICE VISIT (OUTPATIENT)
Dept: CARDIOLOGY | Facility: MEDICAL CENTER | Age: 53
End: 2023-05-01
Payer: COMMERCIAL

## 2023-05-01 VITALS
HEART RATE: 60 BPM | SYSTOLIC BLOOD PRESSURE: 100 MMHG | RESPIRATION RATE: 16 BRPM | BODY MASS INDEX: 22.66 KG/M2 | HEIGHT: 65 IN | DIASTOLIC BLOOD PRESSURE: 70 MMHG | WEIGHT: 136 LBS | OXYGEN SATURATION: 95 %

## 2023-05-01 DIAGNOSIS — I47.10 SVT (SUPRAVENTRICULAR TACHYCARDIA) (HCC): ICD-10-CM

## 2023-05-01 DIAGNOSIS — I47.19 AVNRT (AV NODAL RE-ENTRY TACHYCARDIA) (HCC): ICD-10-CM

## 2023-05-01 PROCEDURE — 93000 ELECTROCARDIOGRAM COMPLETE: CPT | Performed by: INTERNAL MEDICINE

## 2023-05-01 PROCEDURE — 99214 OFFICE O/P EST MOD 30 MIN: CPT | Performed by: INTERNAL MEDICINE

## 2023-05-01 RX ORDER — PROGESTERONE 100 MG/1
CAPSULE ORAL
COMMUNITY
Start: 2023-03-29 | End: 2023-05-01

## 2023-05-01 NOTE — PROGRESS NOTES
"      Cardiology Follow-up Consultation Note    Date of note:    2023    Primary Care Provider: Ling Valladares M.D.    Name:             Yudith Coburn     YOB: 1970  MRN:               [id number]    Chief Complaint   Patient presents with    Supraventricular Tachycardia (SVT)       HISTORY OF PRESENT ILLNESS  Ms. Yudith Coburn is a 53 y.o. female who returns to see us for follow-up of SVT    Pertinent history:  SVT/AVNRT: Was admitted at [facility] in 2018 with SVT.  Was converted with adenosine via EMS.  Palpitations, on metoprolol    Last clinic visit: 2022 with Dr. Cordon.  Is new to me.    Interim History:  Since last visit, occasional episodes of palpitations which she self corrects with vagal maneuvers (cold shower, making herself throw up and cough).  Rarely uses metoprolol.    She is active without any cardiovascular symptoms.  She is on limited by chest pain, pressure, dyspnea or shortness of breath.      History reviewed. No pertinent past medical history.      Past Surgical History:   Procedure Laterality Date    APPENDECTOMY      GYN SURGERY          OTHER ORTHOPEDIC SURGERY      right hip         Current Outpatient Medications   Medication Sig Dispense Refill    metoprolol tartrate (LOPRESSOR) 25 MG Tab Take 1 Tablet by mouth 1 time a day as needed (for tachycardia). Take 1 pill as needed for rapid heart rate 30 Tablet 2    estradiol (VIVELLE DOT) 0.1 MG/24HR PATCH BIWEEKLY       progesterone (PROMETRIUM) 100 MG Cap Take 100 mg by mouth every day.      zolpidem (AMBIEN) 10 MG Tab Take 10 mg by mouth at bedtime as needed.      Magnesium 250 MG Tab Take 250 mg by mouth 2 times a day.      Calcium Carb-Cholecalciferol (CALCIUM 500 + D3 PO) Take 1 Tab by mouth every day.       No current facility-administered medications for this visit.         Allergies   Allergen Reactions    Hydrocodone Itching     \"itching\"          History reviewed. No pertinent family " "history.      Social History     Socioeconomic History    Marital status:      Spouse name: Not on file    Number of children: Not on file    Years of education: Not on file    Highest education level: Not on file   Occupational History    Not on file   Tobacco Use    Smoking status: Never    Smokeless tobacco: Never   Vaping Use    Vaping Use: Never used   Substance and Sexual Activity    Alcohol use: Yes     Comment: occ    Drug use: No    Sexual activity: Not on file   Other Topics Concern    Not on file   Social History Narrative    Not on file     Social Determinants of Health     Financial Resource Strain: Not on file   Food Insecurity: Not on file   Transportation Needs: Not on file   Physical Activity: Not on file   Stress: Not on file   Social Connections: Not on file   Intimate Partner Violence: Not on file   Housing Stability: Not on file         Physical Exam:  Ambulatory Vitals  /70 (BP Location: Left arm, Patient Position: Sitting, BP Cuff Size: Adult)   Pulse 60   Resp 16   Ht 1.651 m (5' 5\")   Wt 61.7 kg (136 lb)   SpO2 95%    Oxygen Therapy:  Pulse Oximetry: 95 %  BP Readings from Last 4 Encounters:   05/01/23 100/70   05/20/22 114/72   04/20/21 112/72   04/04/19 118/70       Weight/BMI: Body mass index is 22.63 kg/m².  Wt Readings from Last 4 Encounters:   05/01/23 61.7 kg (136 lb)   05/20/22 62.1 kg (137 lb)   04/20/21 63.9 kg (140 lb 12.8 oz)   04/20/20 63.5 kg (140 lb)       GEN: Well developed, well nourished and in no acute distress.  HEART: no significant JVD, regular rate and rhythm, normal S1 and S2, no murmurs, no third heart sounds, normal cardiac palpation  LUNG: clear to auscultation bilaterally, no wheezing, no crackles, normal respiratory effort on room air  ABDOMEN: soft, non-tender, non-distended, normal bowel sounds throughout  EXTREMITIES: no peripheral edema noted  VASCULAR: no significantly elevated jugular venous pressure, no carotid bruits noted, radial " pulses 2+ and equal      Lab Data Review:  Lab Results   Component Value Date/Time    CHOLSTRLTOT 185 10/16/2018 08:45 AM     (H) 10/16/2018 08:45 AM    HDL 66 10/16/2018 08:45 AM    TRIGLYCERIDE 88 10/16/2018 08:45 AM       Lab Results   Component Value Date/Time    SODIUM 140 03/23/2018 03:50 AM    POTASSIUM 3.7 03/23/2018 03:50 AM    CHLORIDE 109 03/23/2018 03:50 AM    CO2 22 03/23/2018 03:50 AM    GLUCOSE 100 (H) 10/16/2018 08:45 AM    BUN 7 (L) 03/23/2018 03:50 AM    CREATININE 0.69 03/23/2018 03:50 AM     Lab Results   Component Value Date/Time    ALKPHOSPHAT 49 03/22/2018 09:45 AM    ASTSGOT 16 03/22/2018 09:45 AM    ALTSGPT 10 03/22/2018 09:45 AM    TBILIRUBIN 0.6 03/22/2018 09:45 AM      Lab Results   Component Value Date/Time    WBC 8.7 03/23/2018 03:50 AM     Lab Results   Component Value Date/Time    HBA1C 5.4 03/22/2018 09:45 AM    HBA1C 5.4 03/07/2017 08:45 AM       Cardiac Imaging and Procedures Review:    EKG dated 5/1/2023: My personal interpretation is sinus bradycardia, HR 58 bpm    EKG dated 4/26/2018: My personal interpretation is sinus rhythm    Echo dated 3/23/2018, personally reviewed:   Normal LV size and function  No valve disease        Assessment & Plan     1. SVT (supraventricular tachycardia) (HCC)  EKG    metoprolol tartrate (LOPRESSOR) 25 MG Tab      2. AVNRT (AV nevaeh re-entry tachycardia) (HCC)  metoprolol tartrate (LOPRESSOR) 25 MG Tab          Doing well with occasional palpitations.  Metoprolol as needed is working to resolve symptoms.  No hospitalization since 2018 which is reassuring.  We discussed ablation procedure in detail.  No indication as of now since her episodes are short-lived, self corrected with vagal maneuvers and happen at random.    Refill sent for metoprolol 25 mg once daily as needed.    I have independently interpreted test results and discussed results and management with the patient.    All of patient's excellent questions were answered to the best of  my knowledge and to her satisfaction.  It was a pleasure seeing Ms. Yudith Coburn in my clinic today. Return in about 2 years (around 5/1/2025).  Or earlier if palpitations worsen or increase in intensity.  Patient is aware to call the cardiology clinic with any questions or concerns.      Dmitriy Sánchez MD  [facility], [address]  [address] [address]  Phone: [phone]  Fax: [phone]    Please note that this dictation was created using voice recognition software. I have made every reasonable attempt to correct obvious errors, but it is possible there are errors of grammar and possibly content that I did not discover before finalizing the note.

## 2023-05-02 LAB — EKG IMPRESSION: NORMAL

## 2023-05-17 ENCOUNTER — APPOINTMENT (RX ONLY)
Dept: URBAN - METROPOLITAN AREA CLINIC 35 | Facility: CLINIC | Age: 53
Setting detail: DERMATOLOGY
End: 2023-05-17

## 2023-05-17 DIAGNOSIS — Z41.9 ENCOUNTER FOR PROCEDURE FOR PURPOSES OTHER THAN REMEDYING HEALTH STATE, UNSPECIFIED: ICD-10-CM

## 2023-05-17 PROCEDURE — ? BOTOX

## 2023-05-17 PROCEDURE — ? ADDITIONAL NOTES

## 2023-05-17 NOTE — PROCEDURE: BOTOX
Additional Area 3 Location: Frontalis
Mentalis Units: 0
Expiration Date (Month Year): 2025-12
Dilution (U/0.1 Cc): 1.1
Additional Area 2 Location: Crows Feet
Additional Area 2 Units: 24
Additional Area 1 Location: Glabella
Detail Level: Detailed
Post-Care Instructions: Patient instructed to not lie down for 4 hours after injections and limit physical activity for 24 hours.
Lot #: [id number]
Additional Area 5 Location: perioral
Additional Area 4 Location: Bunny lines
Additional Area 6 Location: platysma
Price (Use Numbers Only, No Special Characters Or $): 417
Consent: Verbal and written informed consent were obtained to include the following risks: pain, swelling, bruising, eyelid or eyebrow droop, and lack of visible improvement of wrinkles in the areas treated.  The skin was cleansed with alcohol. Injections were administered with a 32g needle into the following areas:

## 2023-05-17 NOTE — PROCEDURE: ADDITIONAL NOTES
Detail Level: Simple
Additional Notes: 5/17/23 Discussed half syringe of Volbella for fine lines, lips

## 2023-05-17 NOTE — PROCEDURE: BOTOX
Additional Area 4 Units: 0
Lot #: [id number]
Dilution (U/0.1 Cc): 1.1
Additional Area 1 Location: Glabella
Detail Level: Detailed
Additional Area 4 Location: Bunny lines
Additional Area 5 Location: perioral
Additional Area 6 Location: platysma
Additional Area 2 Location: Crows Feet
Additional Area 3 Location: Frontalis
Expiration Date (Month Year): 2025-12
Post-Care Instructions: Patient instructed to not lie down for 4 hours after injections and limit physical activity for 24 hours.
Consent: Verbal and written informed consent were obtained to include the following risks: pain, swelling, bruising, eyelid or eyebrow droop, and lack of visible improvement of wrinkles in the areas treated.  The skin was cleansed with alcohol. Injections were administered with a 32g needle into the following areas:

## 2023-05-23 ENCOUNTER — APPOINTMENT (RX ONLY)
Dept: URBAN - METROPOLITAN AREA CLINIC 35 | Facility: CLINIC | Age: 53
Setting detail: DERMATOLOGY
End: 2023-05-23

## 2023-05-23 DIAGNOSIS — Z41.9 ENCOUNTER FOR PROCEDURE FOR PURPOSES OTHER THAN REMEDYING HEALTH STATE, UNSPECIFIED: ICD-10-CM

## 2023-05-23 PROCEDURE — ? FILLERS

## 2023-05-23 NOTE — PROCEDURE: FILLERS
Additional Area 5 Location: [place] Filler Volume In Cc: 0
Include Cannula Information In Note?: No
Additional Area 1 Location: Oral Commisures
Expiration Date (Month Year): 2024-08-02
Price (Use Numbers Only, No Special Characters Or $): 473
Filler Comments: Volbella 0.55 ml
Lot #: [id number]
Consent: Written consent obtained. Risks include but not limited to bruising, bleeding, blindness, stroke, delayed onset of nodules, irregular texture, ulceration, infection, allergic reaction, scar formation, incomplete augmentation, temporary nature, procedural pain.
Additional Area 4 Location: Scars
Additional Area 3 Location: Fine lines around mouth
Additional Area 2 Location: Border of lips
Detail Level: Detailed
Use Map Statement For Sites (Optional): Yes
Filler: Juvederm Volbella XC
Map Statment: See Attach Map for Complete Details
Post-Care Instructions: Patient instructed to apply ice to reduce swelling.
Aspiration Statement: Aspiration was performed prior to injecting site with filler.

## 2023-09-19 ENCOUNTER — APPOINTMENT (RX ONLY)
Dept: URBAN - METROPOLITAN AREA CLINIC 35 | Facility: CLINIC | Age: 53
Setting detail: DERMATOLOGY
End: 2023-09-19

## 2023-09-19 DIAGNOSIS — Z71.89 OTHER SPECIFIED COUNSELING: ICD-10-CM

## 2023-09-19 DIAGNOSIS — D22 MELANOCYTIC NEVI: ICD-10-CM | Status: UNCHANGED

## 2023-09-19 DIAGNOSIS — D485 NEOPLASM OF UNCERTAIN BEHAVIOR OF SKIN: ICD-10-CM | Status: INADEQUATELY CONTROLLED

## 2023-09-19 DIAGNOSIS — D18.0 HEMANGIOMA: ICD-10-CM

## 2023-09-19 DIAGNOSIS — L57.0 ACTINIC KERATOSIS: ICD-10-CM

## 2023-09-19 DIAGNOSIS — L81.4 OTHER MELANIN HYPERPIGMENTATION: ICD-10-CM | Status: UNCHANGED

## 2023-09-19 DIAGNOSIS — L82.1 OTHER SEBORRHEIC KERATOSIS: ICD-10-CM

## 2023-09-19 PROBLEM — D23.71 OTHER BENIGN NEOPLASM OF SKIN OF RIGHT LOWER LIMB, INCLUDING HIP: Status: ACTIVE | Noted: 2023-09-19

## 2023-09-19 PROBLEM — D18.01 HEMANGIOMA OF SKIN AND SUBCUTANEOUS TISSUE: Status: ACTIVE | Noted: 2023-09-19

## 2023-09-19 PROBLEM — D48.5 NEOPLASM OF UNCERTAIN BEHAVIOR OF SKIN: Status: ACTIVE | Noted: 2023-09-19

## 2023-09-19 PROBLEM — D22.5 MELANOCYTIC NEVI OF TRUNK: Status: ACTIVE | Noted: 2023-09-19

## 2023-09-19 PROBLEM — D22.61 MELANOCYTIC NEVI OF RIGHT UPPER LIMB, INCLUDING SHOULDER: Status: ACTIVE | Noted: 2023-09-19

## 2023-09-19 PROCEDURE — 11102 TANGNTL BX SKIN SINGLE LES: CPT

## 2023-09-19 PROCEDURE — ? SURGICAL DECISION MAKING

## 2023-09-19 PROCEDURE — ? COUNSELING

## 2023-09-19 PROCEDURE — ? OBSERVATION AND MEASURE

## 2023-09-19 PROCEDURE — ? BIOPSY BY SHAVE METHOD

## 2023-09-19 PROCEDURE — ? ADDITIONAL NOTES

## 2023-09-19 PROCEDURE — 99214 OFFICE O/P EST MOD 30 MIN: CPT | Mod: 25

## 2023-09-19 PROCEDURE — ? SEPARATE AND IDENTIFIABLE DOCUMENTATION

## 2023-09-19 ASSESSMENT — LOCATION SIMPLE DESCRIPTION DERM
LOCATION SIMPLE: NOSE
LOCATION SIMPLE: CHEST
LOCATION SIMPLE: RIGHT PRETIBIAL REGION
LOCATION SIMPLE: RIGHT SHOULDER
LOCATION SIMPLE: LEFT UPPER BACK
LOCATION SIMPLE: LEFT SUBMANDIBULAR AREA
LOCATION SIMPLE: RIGHT ELBOW

## 2023-09-19 ASSESSMENT — LOCATION DETAILED DESCRIPTION DERM
LOCATION DETAILED: RIGHT MEDIAL ELBOW
LOCATION DETAILED: LEFT MID-UPPER BACK
LOCATION DETAILED: LEFT SUBMANDIBULAR AREA
LOCATION DETAILED: RIGHT POSTERIOR SHOULDER
LOCATION DETAILED: RIGHT PROXIMAL PRETIBIAL REGION
LOCATION DETAILED: NASAL SUPRATIP
LOCATION DETAILED: RIGHT MEDIAL SUPERIOR CHEST

## 2023-09-19 ASSESSMENT — LOCATION ZONE DERM
LOCATION ZONE: FACE
LOCATION ZONE: ARM
LOCATION ZONE: NOSE
LOCATION ZONE: LEG
LOCATION ZONE: TRUNK

## 2023-09-19 NOTE — PROCEDURE: BIOPSY BY SHAVE METHOD
Detail Level: Detailed
Depth Of Biopsy: dermis
Was A Bandage Applied: Yes
Size Of Lesion In Cm: 0.7
X Size Of Lesion In Cm: 1.3
Biopsy Type: H and E
Biopsy Method: Dermablade
Anesthesia Type: 1% lidocaine without epinephrine
Anesthesia Volume In Cc: 1.5
Additional Anesthesia Volume In Cc (Will Not Render If 0): 0
Hemostasis: Aluminum Chloride
Wound Care: Petrolatum
Dressing: Band-Aid
Destruction After The Procedure: No
Type Of Destruction Used: Curettage
Curettage Text: The wound bed was treated with curettage after the biopsy was performed.
Electrodesiccation And Curettage Text: The wound bed was treated with electrodesiccation and curettage after the biopsy was
Lab: [id number]
Lab Facility: 
Consent: Verbal consent was obtained and risks were reviewed including but not limited to scarring, infection, bleeding, scabbing, incomplete removal, nerve damage and allergy to anesthesia.
Post-Care Instructions: I reviewed with the patient in detail post-care instructions. Patient is to keep the biopsy site dry overnight, and then apply white petrolatum twice daily until healed. Patient may apply hydrogen peroxide soaks to remove any crusting.
Notification Instructions: Patient will be notified of biopsy results. However, patient instructed to call the office if not contacted within 2 weeks.
Billing Type: Third-Party Bill
Information: Selecting Yes will display possible errors in your note based on the variables you have selected. This validation is only offered as a suggestion for you. PLEASE NOTE THAT THE VALIDATION TEXT WILL BE REMOVED WHEN YOU FINALIZE YOUR NOTE. IF YOU WANT TO FAX A PRELIMINARY NOTE YOU WILL NEED TO TOGGLE THIS TO 'NO' IF YOU DO NOT WANT IT IN YOUR FAXED NOTE.

## 2023-09-19 NOTE — PROCEDURE: SURGICAL DECISION MAKING
Risk Assessment Explanation (Does Not Render In The Note): Clinical determination of the probability and/or consequences of an event, such as surgery. Clinical assessment of the level of risk is affected by the nature of the event under consideration for the patient. Modifier 57 is used to indicate an Evaluation and Management (E/M) service resulted in the initial decision to perform surgery either the day before a major surgery (90 day global) or the day of a major surgery.
Discussion: We discussed not only the risks of the procedure but also the likely sheldon that further treatment will be required to treat lesion. This could involve another visit here to destroy or excise  lesion, a visit to a Mohs or general or plastic surgeon, scarring, limited  activity, cosmetic imperfections.
Identified Risk Factors Documented?: yes
Complexity (Necessary For Coding; Major - 90 Day Global With Some Exceptions; Minor - 10 Day Global): minor
Date Of Surgery - Today Or Tomorrow?: today

## 2023-09-28 ENCOUNTER — APPOINTMENT (RX ONLY)
Dept: URBAN - METROPOLITAN AREA CLINIC 35 | Facility: CLINIC | Age: 53
Setting detail: DERMATOLOGY
End: 2023-09-28

## 2023-09-28 DIAGNOSIS — Z48.817 ENCOUNTER FOR SURGICAL AFTERCARE FOLLOWING SURGERY ON THE SKIN AND SUBCUTANEOUS TISSUE: ICD-10-CM

## 2023-09-28 PROCEDURE — ? POST-OP WOUND CHECK

## 2023-09-28 PROCEDURE — ? COUNSELING

## 2023-09-28 PROCEDURE — ? PRESCRIPTION

## 2023-09-28 RX ORDER — CEPHALEXIN 500 MG/1
1 CAPSULE ORAL QID
Qty: 28 | Refills: 0 | Status: ERX | COMMUNITY
Start: 2023-09-28

## 2023-09-28 RX ADMIN — CEPHALEXIN 1: 500 CAPSULE ORAL at 00:00

## 2023-09-28 ASSESSMENT — LOCATION DETAILED DESCRIPTION DERM: LOCATION DETAILED: LEFT PROXIMAL PRETIBIAL REGION

## 2023-09-28 ASSESSMENT — LOCATION SIMPLE DESCRIPTION DERM: LOCATION SIMPLE: LEFT PRETIBIAL REGION

## 2023-09-28 ASSESSMENT — LOCATION ZONE DERM: LOCATION ZONE: LEG

## 2023-09-28 NOTE — PROCEDURE: POST-OP WOUND CHECK
Detail Level: Detailed
Add 05007 Cpt? (Important Note: In 2017 The Use Of 13436 Is Being Tracked By Cms To Determine Future Global Period Reimbursement For Global Periods): no
Wound Evaluated By: Dr. Sari Thomas
Additional Comments: Neosporin tid\\nIf not improving start Keflex 500 mg QID for 7 days - prescription sent

## 2023-11-22 ENCOUNTER — APPOINTMENT (RX ONLY)
Dept: URBAN - METROPOLITAN AREA CLINIC 35 | Facility: CLINIC | Age: 53
Setting detail: DERMATOLOGY
End: 2023-11-22

## 2023-11-22 DIAGNOSIS — Z41.9 ENCOUNTER FOR PROCEDURE FOR PURPOSES OTHER THAN REMEDYING HEALTH STATE, UNSPECIFIED: ICD-10-CM

## 2023-11-22 PROCEDURE — ? ADDITIONAL NOTES

## 2023-11-22 PROCEDURE — ? BOTOX

## 2023-11-22 NOTE — PROCEDURE: BOTOX
Additional Area 3 Location: Frontalis
Mentalis Units: 0
Expiration Date (Month Year): 2026-05
Dilution (U/0.1 Cc): 1.1
Additional Area 2 Location: Crows Feet
Additional Area 2 Units: 24
Additional Area 1 Location: Glabella
Detail Level: Detailed
Post-Care Instructions: Patient instructed to not lie down for 4 hours after injections and limit physical activity for 24 hours.
Lot #: [id number]
Additional Area 5 Location: perioral
Additional Area 4 Location: Bunny lines
Additional Area 6 Location: platysma
Price (Use Numbers Only, No Special Characters Or $): 859
Consent: Verbal and written informed consent were obtained to include the following risks: pain, swelling, bruising, eyelid or eyebrow droop, and lack of visible improvement of wrinkles in the areas treated.  The skin was cleansed with alcohol. Injections were administered with a 32g needle into the following areas:

## 2024-05-08 ENCOUNTER — APPOINTMENT (RX ONLY)
Dept: URBAN - METROPOLITAN AREA CLINIC 35 | Facility: CLINIC | Age: 54
Setting detail: DERMATOLOGY
End: 2024-05-08

## 2024-05-08 DIAGNOSIS — Z41.9 ENCOUNTER FOR PROCEDURE FOR PURPOSES OTHER THAN REMEDYING HEALTH STATE, UNSPECIFIED: ICD-10-CM

## 2024-05-08 PROCEDURE — ? BOTOX

## 2024-05-08 PROCEDURE — ? ADDITIONAL NOTES

## 2024-05-08 NOTE — PROCEDURE: BOTOX
Additional Area 3 Location: Frontalis
Mentalis Units: 0
Expiration Date (Month Year): 2026-09
Dilution (U/0.1 Cc): 1.1
Additional Area 2 Location: Crows Feet
Additional Area 2 Units: 24
Additional Area 1 Location: Glabella
Detail Level: Detailed
Post-Care Instructions: Patient instructed to not lie down for 4 hours after injections and limit physical activity for 24 hours.
Lot #: [id number]
Additional Area 5 Location: perioral
Additional Area 4 Location: Bunny lines
Additional Area 6 Location: platysma
Price (Use Numbers Only, No Special Characters Or $): 644
Consent: Verbal and written informed consent were obtained to include the following risks: pain, swelling, bruising, eyelid or eyebrow droop, and lack of visible improvement of wrinkles in the areas treated.  The skin was cleansed with alcohol. Injections were administered with a 32g needle into the following areas:

## 2024-09-24 ENCOUNTER — APPOINTMENT (RX ONLY)
Dept: URBAN - METROPOLITAN AREA CLINIC 35 | Facility: CLINIC | Age: 54
Setting detail: DERMATOLOGY
End: 2024-09-24

## 2024-09-24 DIAGNOSIS — L57.0 ACTINIC KERATOSIS: ICD-10-CM

## 2024-09-24 DIAGNOSIS — D22 MELANOCYTIC NEVI: ICD-10-CM

## 2024-09-24 DIAGNOSIS — L82.1 OTHER SEBORRHEIC KERATOSIS: ICD-10-CM

## 2024-09-24 DIAGNOSIS — D18.0 HEMANGIOMA: ICD-10-CM

## 2024-09-24 DIAGNOSIS — Z71.89 OTHER SPECIFIED COUNSELING: ICD-10-CM

## 2024-09-24 DIAGNOSIS — L81.4 OTHER MELANIN HYPERPIGMENTATION: ICD-10-CM

## 2024-09-24 PROBLEM — D22.5 MELANOCYTIC NEVI OF TRUNK: Status: ACTIVE | Noted: 2024-09-24

## 2024-09-24 PROBLEM — D18.01 HEMANGIOMA OF SKIN AND SUBCUTANEOUS TISSUE: Status: ACTIVE | Noted: 2024-09-24

## 2024-09-24 PROBLEM — D23.71 OTHER BENIGN NEOPLASM OF SKIN OF RIGHT LOWER LIMB, INCLUDING HIP: Status: ACTIVE | Noted: 2024-09-24

## 2024-09-24 PROBLEM — D22.61 MELANOCYTIC NEVI OF RIGHT UPPER LIMB, INCLUDING SHOULDER: Status: ACTIVE | Noted: 2024-09-24

## 2024-09-24 PROCEDURE — 17000 DESTRUCT PREMALG LESION: CPT

## 2024-09-24 PROCEDURE — ? COUNSELING

## 2024-09-24 PROCEDURE — 17003 DESTRUCT PREMALG LES 2-14: CPT

## 2024-09-24 PROCEDURE — ? SUNSCREEN RECOMMENDATIONS

## 2024-09-24 PROCEDURE — 99213 OFFICE O/P EST LOW 20 MIN: CPT | Mod: 25

## 2024-09-24 PROCEDURE — ? OBSERVATION AND MEASURE

## 2024-09-24 PROCEDURE — ? LIQUID NITROGEN

## 2024-09-24 ASSESSMENT — LOCATION SIMPLE DESCRIPTION DERM
LOCATION SIMPLE: RIGHT SHOULDER
LOCATION SIMPLE: RIGHT ELBOW
LOCATION SIMPLE: CHEST
LOCATION SIMPLE: RIGHT FOREARM
LOCATION SIMPLE: LEFT UPPER BACK
LOCATION SIMPLE: LEFT SUBMANDIBULAR AREA
LOCATION SIMPLE: LEFT EAR

## 2024-09-24 ASSESSMENT — LOCATION ZONE DERM
LOCATION ZONE: TRUNK
LOCATION ZONE: ARM
LOCATION ZONE: FACE
LOCATION ZONE: EAR

## 2024-09-24 ASSESSMENT — LOCATION DETAILED DESCRIPTION DERM
LOCATION DETAILED: LEFT MID-UPPER BACK
LOCATION DETAILED: RIGHT MEDIAL SUPERIOR CHEST
LOCATION DETAILED: RIGHT POSTERIOR SHOULDER
LOCATION DETAILED: RIGHT MEDIAL ELBOW
LOCATION DETAILED: LEFT SUPERIOR HELIX
LOCATION DETAILED: RIGHT DISTAL DORSAL FOREARM
LOCATION DETAILED: LEFT SUBMANDIBULAR AREA

## 2024-09-24 NOTE — PROCEDURE: LIQUID NITROGEN
Show Aperture Variable?: Yes
Detail Level: Detailed
Number Of Freeze-Thaw Cycles: 2 freeze-thaw cycles
Consent: The patient's consent was obtained including but not limited to risks of crusting, scabbing, blistering, scarring, darker or lighter pigmentary change, recurrence, incomplete removal and infection.
Render Post-Care Instructions In Note?: no
Duration Of Freeze Thaw-Cycle (Seconds): 2
Post-Care Instructions: I reviewed with the patient in detail post-care instructions. Patient is to wear sunprotection, and avoid picking at any of the treated lesions. Pt may apply Vaseline to crusted or scabbing areas.
Application Tool (Optional): Cry-AC

## 2024-10-22 ENCOUNTER — APPOINTMENT (RX ONLY)
Dept: URBAN - METROPOLITAN AREA CLINIC 35 | Facility: CLINIC | Age: 54
Setting detail: DERMATOLOGY
End: 2024-10-22

## 2024-10-22 DIAGNOSIS — Z41.9 ENCOUNTER FOR PROCEDURE FOR PURPOSES OTHER THAN REMEDYING HEALTH STATE, UNSPECIFIED: ICD-10-CM

## 2024-10-22 PROCEDURE — ? BOTOX

## 2024-10-22 PROCEDURE — ? ADDITIONAL NOTES

## 2024-10-22 NOTE — PROCEDURE: BOTOX
Additional Area 3 Location: Frontalis
Mentalis Units: 0
Expiration Date (Month Year): 2026-10
Dilution (U/0.1 Cc): 1.1
Additional Area 2 Location: Crows Feet
Additional Area 2 Units: 24
Additional Area 1 Location: Glabella
Detail Level: Detailed
Post-Care Instructions: Patient instructed to not lie down for 4 hours after injections and limit physical activity for 24 hours.
Lot #: [id number]
Additional Area 5 Location: perioral
Additional Area 4 Location: Bunny lines
Additional Area 6 Location: platysma
Price (Use Numbers Only, No Special Characters Or $): 818
Consent: Verbal and written informed consent were obtained to include the following risks: pain, swelling, bruising, eyelid or eyebrow droop, and lack of visible improvement of wrinkles in the areas treated.  The skin was cleansed with alcohol. Injections were administered with a 32g needle into the following areas:

## 2025-04-03 ENCOUNTER — OFFICE VISIT (OUTPATIENT)
Dept: CARDIOLOGY | Facility: MEDICAL CENTER | Age: 55
End: 2025-04-03
Attending: INTERNAL MEDICINE
Payer: COMMERCIAL

## 2025-04-03 VITALS
HEART RATE: 74 BPM | WEIGHT: 144 LBS | RESPIRATION RATE: 16 BRPM | HEIGHT: 65 IN | BODY MASS INDEX: 23.99 KG/M2 | DIASTOLIC BLOOD PRESSURE: 60 MMHG | SYSTOLIC BLOOD PRESSURE: 100 MMHG | OXYGEN SATURATION: 98 %

## 2025-04-03 DIAGNOSIS — I47.10 SVT (SUPRAVENTRICULAR TACHYCARDIA) (HCC): ICD-10-CM

## 2025-04-03 PROCEDURE — 99213 OFFICE O/P EST LOW 20 MIN: CPT | Performed by: INTERNAL MEDICINE

## 2025-04-03 PROCEDURE — 99212 OFFICE O/P EST SF 10 MIN: CPT | Performed by: INTERNAL MEDICINE

## 2025-04-03 PROCEDURE — 93005 ELECTROCARDIOGRAM TRACING: CPT | Mod: TC | Performed by: INTERNAL MEDICINE

## 2025-04-03 NOTE — ASSESSMENT & PLAN NOTE
Clinically, she is doing excellent and not having further episodes of palpitation suggestive of recurrence of her supraventricular tachycardia.  At this time she has not had to utilize her metoprolol therapy except on an as-needed basis which she has not had to utilize any dosages since her last office visit.

## 2025-04-03 NOTE — PROGRESS NOTES
[facility]    PatientName:Yudith CoburnDate: 4/3/2025  :1970    55 y.o.PCP:Ling Valladares M.D.  MRN:[id number]        Problems and Plans    SVT (supraventricular tachycardia) (CMS-HCC) (HCC)  Clinically, she is doing excellent and not having further episodes of palpitation suggestive of recurrence of her supraventricular tachycardia.  At this time she has not had to utilize her metoprolol therapy except on an as-needed basis which she has not had to utilize any dosages since her last office visit.    Return in about 1 year (around 4/3/2026).      Encounter    Reason for Visit / Chief Complaint: Supraventricular tachycardia    HPI    55-year-old female with known history of supraventricular tachycardia presents in clinic for ongoing management of her supraventricular tachycardia.    Currently, she notes she is feeling well not having active cardiovascular complaints.  She able to do her desired activities and she enjoys skiing and hiking.  She has started to workout with a .  No recent cardiovascular workup.      Past Medical History  No past medical history on file.  Past Surgical History  Past Surgical History:   Procedure Laterality Date    APPENDECTOMY      GYN SURGERY          OTHER ORTHOPEDIC SURGERY      right hip     Social History  Social History     Socioeconomic History    Marital status:      Spouse name: Not on file    Number of children: Not on file    Years of education: Not on file    Highest education level: Not on file   Occupational History    Not on file   Tobacco Use    Smoking status: Never    Smokeless tobacco: Never   Vaping Use    Vaping status: Never Used   Substance and Sexual Activity    Alcohol use: Yes     Comment: occ    Drug use: No    Sexual activity: Not on file   Other Topics Concern    Not on file   Social History Narrative    Not on file     Social Drivers of Health     Financial Resource  "Strain: Not on file   Food Insecurity: Not on file   Transportation Needs: Not on file   Physical Activity: Not on file   Stress: Not on file   Social Connections: Not on file   Intimate Partner Violence: Not on file   Housing Stability: Not on file     Past Family History  No family history on file.  Medication(s)    Current Outpatient Medications:     metoprolol tartrate, 25 mg, Oral, QDAY PRN, PRN    estradiol, , Taking    progesterone, 100 mg, Oral, DAILY, Taking    zolpidem, 10 mg, Oral, HS PRN, Taking    Magnesium, 250 mg, Oral, BID, Taking    Calcium Carb-Cholecalciferol (CALCIUM 500 + D3 PO), 1 Tablet, Oral, DAILY, Taking  Allergies  Hydrocodone    Review of Systems    A comprehensive 10 system review was conducted and is negative except as noted above in the HPI or here.      Vital Signs  /60 (BP Location: Left arm, Patient Position: Sitting, BP Cuff Size: Adult)   Pulse 74   Resp 16   Ht 1.651 m (5' 5\")   Wt 65.3 kg (144 lb)   SpO2 98%   BMI 23.96 kg/m²     Physical Exam  Vitals and nursing note reviewed.   Constitutional:       Appearance: Normal appearance.   HENT:      Head: Normocephalic and atraumatic.      Nose: Nose normal.      Mouth/Throat:      Mouth: Mucous membranes are moist.      Pharynx: Oropharynx is clear.   Eyes:      Pupils: Pupils are equal, round, and reactive to light.   Cardiovascular:      Rate and Rhythm: Normal rate and regular rhythm.      Heart sounds: No murmur heard.     No friction rub. No gallop.   Pulmonary:      Effort: Pulmonary effort is normal.      Breath sounds: Normal breath sounds.   Abdominal:      General: Bowel sounds are normal.      Palpations: Abdomen is soft.   Musculoskeletal:         General: Normal range of motion.      Cervical back: Normal range of motion and neck supple.   Skin:     General: Skin is warm and dry.   Neurological:      General: No focal deficit present.      Mental Status: She is alert and oriented to person, place, and time. " Mental status is at baseline.   Psychiatric:         Mood and Affect: Mood normal.         Behavior: Behavior normal.         Thought Content: Thought content normal.         Judgment: Judgment normal.         Lab Results   Component Value Date/Time    TSHULTRASEN 2.510 03/22/2018 0945        Lab Results   Component Value Date/Time    FREET4 0.92 03/22/2018 0945        Lab Results   Component Value Date/Time    HBA1C 5.4 03/22/2018 09:45 AM       Lab Results   Component Value Date/Time    CHOLSTRLTOT 185 10/16/2018 08:45 AM     (H) 10/16/2018 08:45 AM    HDL 66 10/16/2018 08:45 AM    TRIGLYCERIDE 88 10/16/2018 08:45 AM         Lab Results   Component Value Date/Time    SODIUM 140 03/23/2018 03:50 AM    POTASSIUM 3.7 03/23/2018 03:50 AM    CHLORIDE 109 03/23/2018 03:50 AM    CO2 22 03/23/2018 03:50 AM    GLUCOSE 100 (H) 10/16/2018 08:45 AM    BUN 7 (L) 03/23/2018 03:50 AM    CREATININE 0.69 03/23/2018 03:50 AM       Lab Results   Component Value Date/Time    ALKPHOSPHAT 49 03/22/2018 09:45 AM    ASTSGOT 16 03/22/2018 09:45 AM    ALTSGPT 10 03/22/2018 09:45 AM    TBILIRUBIN 0.6 03/22/2018 09:45 AM         Imaging  EKG demonstrates sinus rhythm with normal axis.    Echocardiogram  Results for orders placed or performed during the hospital encounter of 03/22/18   ECHOCARDIOGRAM COMP W/O CONT    Collection Time: 03/22/18  7:51 PM   Result Value Ref Range    Eject.Frac. MOD BP 60.88     Eject.Frac. MOD 4C 65.76     Eject.Frac. MOD 2C 55.45     Left Ventrical Ejection Fraction 60        None      Total patient time was estimated to be 20 minutes consisting of chart review, direct patient interaction, medication renewal, plan development and overall communication with the cardiovascular team.        Electronically signed by:   Juan C Danielson DO, MPH  [facility]    Portions of this note were completed using voice recognition software (Dragon Naturally speaking software) . Occasional  transcription errors may have escaped proof reading. I have made every reasonable attempt to correct obvious errors, but I expect that there are errors of grammar and possibly content that I did not discover before finalizing the note.

## 2025-04-08 LAB — EKG IMPRESSION: NORMAL

## 2025-04-08 PROCEDURE — 93010 ELECTROCARDIOGRAM REPORT: CPT | Performed by: INTERNAL MEDICINE

## 2025-05-07 ENCOUNTER — APPOINTMENT (OUTPATIENT)
Dept: URBAN - METROPOLITAN AREA CLINIC 35 | Facility: CLINIC | Age: 55
Setting detail: DERMATOLOGY
End: 2025-05-07

## 2025-05-07 DIAGNOSIS — Z41.9 ENCOUNTER FOR PROCEDURE FOR PURPOSES OTHER THAN REMEDYING HEALTH STATE, UNSPECIFIED: ICD-10-CM

## 2025-05-07 PROCEDURE — ? BOTOX

## 2025-05-07 NOTE — PROCEDURE: BOTOX
Lot #: [id number]
Additional Area 6 Units: 0
Dilution (U/0.1 Cc): 5
Additional Area 6 Location: platysma
Additional Area 1 Location: Glabella
Additional Area 3 Location: Frontalis
Additional Area 4 Location: Bunny lines
Additional Area 2 Location: Crows Feet
Detail Level: Detailed
Additional Area 5 Location: perioral
Expiration Date (Month Year): 2/2/2027
Price (Use Numbers Only, No Special Characters Or $): 827
Post-Care Instructions: Patient instructed to not lie down for 4 hours after injections and limit physical activity for 24 hours.
Consent: Verbal and written informed consent were obtained to include the following risks: pain, swelling, bruising, eyelid or eyebrow droop, and lack of visible improvement of wrinkles in the areas treated.  The skin was cleansed with alcohol. Injections were administered with a 32g needle into the following areas:
Show Glabellar Units: Yes
Incrementing Botox Units: By 0.1 Units
Comments: 5/7/25: pt stuck with the same face map recipe from 10/22/24
Show Mentalis Units: No
Additional Area 1 Units: 24